# Patient Record
Sex: FEMALE | Race: BLACK OR AFRICAN AMERICAN | NOT HISPANIC OR LATINO
[De-identification: names, ages, dates, MRNs, and addresses within clinical notes are randomized per-mention and may not be internally consistent; named-entity substitution may affect disease eponyms.]

---

## 2018-11-21 PROBLEM — Z00.00 ENCOUNTER FOR PREVENTIVE HEALTH EXAMINATION: Status: ACTIVE | Noted: 2018-11-21

## 2018-11-28 ENCOUNTER — APPOINTMENT (OUTPATIENT)
Dept: ORTHOPEDIC SURGERY | Facility: CLINIC | Age: 48
End: 2018-11-28
Payer: COMMERCIAL

## 2018-11-28 VITALS
HEART RATE: 60 BPM | HEIGHT: 64 IN | WEIGHT: 205 LBS | SYSTOLIC BLOOD PRESSURE: 107 MMHG | BODY MASS INDEX: 35 KG/M2 | DIASTOLIC BLOOD PRESSURE: 69 MMHG

## 2018-11-28 PROCEDURE — 73564 X-RAY EXAM KNEE 4 OR MORE: CPT | Mod: LT

## 2018-11-28 PROCEDURE — 99204 OFFICE O/P NEW MOD 45 MIN: CPT

## 2019-04-17 ENCOUNTER — RESULT REVIEW (OUTPATIENT)
Age: 49
End: 2019-04-17

## 2019-11-06 ENCOUNTER — APPOINTMENT (OUTPATIENT)
Dept: ORTHOPEDIC SURGERY | Facility: CLINIC | Age: 49
End: 2019-11-06
Payer: COMMERCIAL

## 2019-11-06 VITALS
HEART RATE: 48 BPM | SYSTOLIC BLOOD PRESSURE: 114 MMHG | WEIGHT: 197 LBS | BODY MASS INDEX: 32.43 KG/M2 | DIASTOLIC BLOOD PRESSURE: 72 MMHG | HEIGHT: 65.5 IN

## 2019-11-06 DIAGNOSIS — Z78.9 OTHER SPECIFIED HEALTH STATUS: ICD-10-CM

## 2019-11-06 DIAGNOSIS — Z98.890 OTHER SPECIFIED POSTPROCEDURAL STATES: ICD-10-CM

## 2019-11-06 PROCEDURE — 73564 X-RAY EXAM KNEE 4 OR MORE: CPT | Mod: LT

## 2019-11-06 PROCEDURE — 99204 OFFICE O/P NEW MOD 45 MIN: CPT

## 2019-11-06 RX ORDER — DICLOFENAC SODIUM 50 MG/1
50 TABLET, DELAYED RELEASE ORAL
Qty: 60 | Refills: 0 | Status: ACTIVE | COMMUNITY
Start: 2019-11-06 | End: 1900-01-01

## 2019-11-06 RX ORDER — HYALURONATE SOD, CROSS-LINKED 30 MG/3 ML
30 SYRINGE (ML) INTRAARTICULAR
Qty: 1 | Refills: 0 | Status: ACTIVE | COMMUNITY
Start: 2019-11-06 | End: 1900-01-01

## 2019-11-06 NOTE — DISCUSSION/SUMMARY
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 The patella has a convex v shaped joint surface and the trochlea a matching concave v shape. The patella runs in the trochlea valley as the knee bends, increasing the leverage and allowing knee range of motion. However the patellofemoral joint experiences 20-50x the body weight in force when going up and down stairs during mid-flexion of the knee. The joint is most symptomatic with prolonged knee flexion or going up/down stairs. The treatment for this problem is predominantly non-operative consisting of patient education, activity modification, physical therapy with focus on VMO strengthening, oral and topical non-steroidal anti-inflammatories, knee braces including lateral J/shield's brace or infrapatellar band, and corticosteroid/viscosupplementation injection. The symptoms are chronic and are difficult to treat. In cases of failure to respond to non-operative management surgical options can be considered.\par \par \par Physical therapy prescribed with knee ROM exercises, quad/hamstring/VMO/Hip abductor strengthening, knee taping, patella mobilization, modalities PRN, and home exercise program.\par \par The patient was prescribed Diclofenac PO non-steroidal anti-inflammatory medication. 50mg tablets twice daily to be taken for at least 1-2 weeks in a row and then PRN afterwards. Risks and benefits were discussed and include but not limited to renal damage and GI ulceration and bleeding.  They were advised to take with food to limit stomach upset as well as warned to stop the medication if worsening gastric pain or dizziness or other side effects. Also to immediately stop the medication and seek appriate medical attention if any severe stomach ache, gastritis, black/red vomit, black/red stools or any other medical concern.\par \par Due to failure of prior non-operative modalities of treatment including physical therapy, activity modification, non-steroidal anti-inflammatory medications, and weight-loss with failure of multiple prior corticosteroid injections without appropriate improvement in symptoms and concern for too many frequent injections causing increased risk for adverse events, I am ordering a viscosupplementation injection gel one left knee and will obtain insurance authorization. The patient will be contacted by our authorization department over its status, copayment and when available for injection.\par \par The patient verifies their understanding the the visit, diagnosis and plan. They agree with the treatment plan and will contact the office with any questions or problems.\par \par Follow up\par 4 injection\par

## 2019-11-06 NOTE — PHYSICAL EXAM
[de-identified] : Physical Examination\par General: well nourished, in no acute distress, alert and oriented to person, place and time\par Psychiatric: normal mood and affect, no abnormal movements or speech patterns\par Eyes: vision intact - glasses\par Throat: no thyromegaly\par Lymph: no enlarged nodes, no lymphedema in extremity\par Respiratory: no wheezing, no shortness of breath with ambulation\par Cardiac: no cardiac leg swelling, 2+ peripheral pulses\par Neurology: normal gross sensation in extremities to light touch\par Abdomen: soft, non-tender, tympanic, no masses\par \par Musculoskeletal Examination\par Ambulation	+ antalgic gait, + assistive devices\par Severe left lower extremity guarding significantly limiting examination\par Knee			Right			Left\par General\par      Swelling/Deformity	normal			lateral patella bony protrusion, non-mobile attachted to patella, mild tender, not attached to subcutaneous skin	\par      Skin			normal			normal\par      Erythema		-			-\par      Standing Alignment	neutral			neutral\par      Effusion		none			trace\par Range of Motion\par      Hip			full painless ROM		full painless ROM&\par      Knee Flexion		130			100 limited\par      Knee Extension	0			0\par Complaint of pain and locking at 10° of flexion \par Patella\par      J Sign		-			-&\par      Quad Medial/Lateral	1/1 1/1\par      Apprehension		-			-&\par      Shalom's		-			+\par      Grind Sign		-			+\par      Crepitus		-			+\par Palpation patient diffusely tender tyhrough left knee\par      Medial Joint Line	-			-\par      Medial Fem Condyle	-			-\par      Lateral Joint Line	-			+\par      Quad Tendon		-			-\par      Patella Tendon	-			-\par      Medial Patella		-			+\par      Lateral Patella 	-			+\par      Posterior Knee	-			-\par Ligamentous\par      Varus @ 0° / 30°	-/-			-/-&\par      Valgus @ 0° / 30°	-/-			-/-&\par \par Strength Examination/Atrophy\par      Hip Flexors 		5+			5+&\par      Quadriceps		5+			5+&\par      Hamstring		5+			5+&\par      Tibialis Anterior	5+			5+&\par      Achilles/Soleus	5+			5+&\par Sensation\par      Deep Peroneal	normal			normal\par      Superficial Peroneal 	normal			normal\par      Sural  		normal			normal\par      Posterior Tibial 	normal			normal\par      Saphneous 		normal			normal\par Pulses\par      DP			2+			2+\par  [de-identified] : 5 views of the affected Left knee (standing AP, flexing standing AP, 30degree flexed lateral, 0degree lateral, sunrise view)\par were ordered, obtained and evaluated by myself today and\par demonstrate:\par There is no narrowing\par Trace osteophytic lipping\par Trace suprapatellar effusion\par Lateral bony ossicle with small osteophyte and trace patellofemoral joint space loss without evidence of tilt [or] subluxation on sunrise view with cystic changes patella\par Normal soft tissue density\par Otherwise normal osseous bone structure without fracture or dislocation\par \par \par MRI Left knee from Danielle Velasquez on 11 S6 to 18\par My impression of the images:\par Quality of the MRI is good\par Medial Meniscus ok\par Lateral Meniscus ok\par There is moderate to severe focal chondral loss in the patellofemoral compartments\par There is bone marrow edema[/subchondral cysts] in the patellofemoral and medial compartments\par LCL is intact\par MCL is intact\par ACL is intact\par PCL is intact \par Quadriceps Tendon is intact\par Patella Tendon is intact\par bony ossicle lateral patella, possible synchondrosis or soft tissue attachment, no edema\par \par The Final Radiologist Impression:\par The skin and subcutis are unremarkable.\par There is a small joint effusion. A 1.4 cm curvilinear osseous loose body is noted along\par the lateral margin of the mid patella.\par No popliteal cyst is visualized.\par There is no evidence of stress injury, fracture, osteonecrosis or osseous neoplasm.\par There is minimal osteoarthritis. Hypertrophic spurring of the lateral femoral condyle.\par Focal subcortical cystic changes noted involving the medial aspect of the medial tibial\par plateau. The articular surfaces are otherwise intact.\par The medial meniscus is unremarkable in MR appearance, without evidence of degeneration or\par tear.\par The lateral meniscus is unremarkable in MR appearance, without evidence of degeneration or\par tear.\par The ACL is unremarkable.\par The PCL is unremarkable.\par The collateral ligaments are unremarkable.\par The iliotibial band, fibular collateral ligament and biceps femoris tendon are\par unremarkable.\par The popliteus muscle-tendon unit is unremarkable.\par The quadriceps and patellar tendons are intact. There is thickening of the patellar\par insertion of the lateral retinaculum.\par There is advanced chondromalacia patella with cartilage thinning and subcortical cystic\par change involving both medial and lateral retropatellar facet. There is superimposed\par arthritic change of the lateral patellofemoral joint.\par IMPRESSION:\par Advanced chondromalacia patella with superimposed arthritic change of the lateral\par patellofemoral joint. There is thickening of the patellar attachment of the lateral\par retinaculum and a1.4 cm osseous loose body is noted along the lateral margin of the mid\par patella.\par Minimal arthritic changes involving both medial and lateral joint compartments, as\par described..\par

## 2019-11-06 NOTE — HISTORY OF PRESENT ILLNESS
[de-identified] : CC LEFT knee\par \par NOAM OHARA, 49 year old F RHD presents with chronic onset of 1 year of Intermittent pain in the lateral LEFT knee from stepping down a low step. The pain is worse, and rated a 5# out of 10, described as Sharp, without radiation. Rest makes the pain better and stairs and extending makes the pain worse. The patient reports associated symptoms of Locking/Grinding. The patient denies pain at night affecting sleep, and denies similar pain previously.\par \par The patient has tried the following treatments:\par Activity modification	+ \par Ice/Compression  	               + \par Braces    		               -\par Nsaids    		               +\par Physical Therapy  	              -\par Cortisone Injection	             +  no relief  \par Visco Injection		+  6 month 80% relief\par Arthroscopy		-\par \par Review of Systems is positive for the above musculoskeletal symptoms and is otherwise non-contributory for general, constitutional, psychiatric, neurologic, HEENT, cardiac, respiratory, gastrointestinal, reproductive, lymphatic, and dermatologic complaints.\par \par Consult by DUANE Abel\par

## 2019-12-16 ENCOUNTER — APPOINTMENT (OUTPATIENT)
Dept: ORTHOPEDIC SURGERY | Facility: CLINIC | Age: 49
End: 2019-12-16
Payer: COMMERCIAL

## 2019-12-16 DIAGNOSIS — M17.12 UNILATERAL PRIMARY OSTEOARTHRITIS, LEFT KNEE: ICD-10-CM

## 2019-12-16 DIAGNOSIS — M23.42 LOOSE BODY IN KNEE, LEFT KNEE: ICD-10-CM

## 2019-12-16 DIAGNOSIS — M22.42 CHONDROMALACIA PATELLAE, LEFT KNEE: ICD-10-CM

## 2019-12-16 PROCEDURE — 20611 DRAIN/INJ JOINT/BURSA W/US: CPT | Mod: LT

## 2019-12-16 NOTE — PROCEDURE
[de-identified] : Chief complaint:     Bilateral knee pain\par \par Diagnosis:              Bilateral knee osteoarthritis\par \par \par Injection:\par Gel one x1\par      [id number]\par \par \par Procedure Note:\par \par Risks and benefits of an arthrocentesis and intraarticular hyaluronic injection of the LEFT and RIGHT knee were discussed with the patient. Potential adverse effects were discussed including but not limited to bleeding, skin/ joint infection, local skin reactions including bleaching, bruising, stiffness, soreness, vasovagal episodes, transient hyperglycemia, avascular necrosis, pseudo-septic type reactions, post injection joint pain, allergic reaction to product or anesthetic and other rare but potential adverse effects along with benefits including decreased pain and improved stability prior to obtaining verbal informed consent. It was also discussed that for some patients the treatment is ineffective and there are no guarantees that the patient will experience improvement as the result of the injection. In rare occasions the injection can cause worsening of pain.\par \par The use of a Sonosite 15-6 MHz linear transducer with live ultrasound guidance of the knee was necessary given the patient's BMI and local body habitus overlying and obscuring the accurate identification of normal body bony anatomy used to identify the injection site and the depth of soft tissue envelope necessitating a longer than normal needle to reach the joint space, and to confirm the location of the needle tip and intra-articular delivery of the medication. Without the use of live ultrasound guidance the injection would have been more difficult and place the patient's neurovascular structures at risk from the longer needle needed to traverse the soft tissue envelope.\par \par A 6 inch bolster was placed underneath the knee to place the LEFT knee in a slightly flexed position. The superolateral injection site was identified using the ultrasound probe to identify the suprapatellar space and superior boarder of the patella first longitudinally and then transversely. The injection site was marked and prepped with a ChloraPrep swab and anesthetized with ethylchloride skin anesthesia. Under sterile technique a 20g 3 1/2 in spinal needle with a preloaded viscosupplementation syringe was passed through the injection site towards the suprapatellar space under live ultrasound guidance and noted to penetrate the joint capsule. The medication was injected without resistance under live ultrasound visualization and noted to flow into the suprapatellar joint space. The injection site was sterilely dressed, there was minimal blood loss.\par \par The patient tolerated this procedure without any complications done by myself. Images were recorded and saved.\par \par The patient has been advised that if they notice any worsening of symptoms or any problems to contact me and seek care from a qualified medical professional. The patient was instructed to ice the knee and take NSAID medication on an as needed basis if the patient feels discomfort.\par \par Followup injection [6 months]

## 2020-10-16 ENCOUNTER — EMERGENCY (EMERGENCY)
Facility: HOSPITAL | Age: 50
LOS: 1 days | Discharge: HOME | End: 2020-10-16
Attending: EMERGENCY MEDICINE | Admitting: EMERGENCY MEDICINE
Payer: COMMERCIAL

## 2020-10-16 VITALS
RESPIRATION RATE: 18 BRPM | OXYGEN SATURATION: 100 % | HEART RATE: 88 BPM | SYSTOLIC BLOOD PRESSURE: 135 MMHG | TEMPERATURE: 100 F | DIASTOLIC BLOOD PRESSURE: 78 MMHG

## 2020-10-16 VITALS
OXYGEN SATURATION: 100 % | DIASTOLIC BLOOD PRESSURE: 88 MMHG | HEART RATE: 94 BPM | SYSTOLIC BLOOD PRESSURE: 143 MMHG | TEMPERATURE: 100 F | RESPIRATION RATE: 18 BRPM

## 2020-10-16 DIAGNOSIS — Z98.890 OTHER SPECIFIED POSTPROCEDURAL STATES: Chronic | ICD-10-CM

## 2020-10-16 LAB
ALBUMIN SERPL ELPH-MCNC: 4.1 G/DL — SIGNIFICANT CHANGE UP (ref 3.3–5)
ALP SERPL-CCNC: 112 U/L — SIGNIFICANT CHANGE UP (ref 40–120)
ALT FLD-CCNC: 28 U/L — SIGNIFICANT CHANGE UP (ref 4–33)
ANION GAP SERPL CALC-SCNC: 10 MMO/L — SIGNIFICANT CHANGE UP (ref 7–14)
AST SERPL-CCNC: 42 U/L — HIGH (ref 4–32)
BASE EXCESS BLDV CALC-SCNC: 3.1 MMOL/L — SIGNIFICANT CHANGE UP
BASOPHILS # BLD AUTO: 0.06 K/UL — SIGNIFICANT CHANGE UP (ref 0–0.2)
BASOPHILS NFR BLD AUTO: 0.4 % — SIGNIFICANT CHANGE UP (ref 0–2)
BILIRUB SERPL-MCNC: 0.5 MG/DL — SIGNIFICANT CHANGE UP (ref 0.2–1.2)
BLOOD GAS VENOUS - CREATININE: 0.91 MG/DL — SIGNIFICANT CHANGE UP (ref 0.5–1.3)
BUN SERPL-MCNC: 9 MG/DL — SIGNIFICANT CHANGE UP (ref 7–23)
CALCIUM SERPL-MCNC: 9.3 MG/DL — SIGNIFICANT CHANGE UP (ref 8.4–10.5)
CHLORIDE BLDV-SCNC: 111 MMOL/L — HIGH (ref 96–108)
CHLORIDE SERPL-SCNC: 102 MMOL/L — SIGNIFICANT CHANGE UP (ref 98–107)
CO2 SERPL-SCNC: 26 MMOL/L — SIGNIFICANT CHANGE UP (ref 22–31)
CREAT SERPL-MCNC: 0.78 MG/DL — SIGNIFICANT CHANGE UP (ref 0.5–1.3)
EOSINOPHIL # BLD AUTO: 0.15 K/UL — SIGNIFICANT CHANGE UP (ref 0–0.5)
EOSINOPHIL NFR BLD AUTO: 1 % — SIGNIFICANT CHANGE UP (ref 0–6)
GAS PNL BLDV: 134 MMOL/L — LOW (ref 136–146)
GLUCOSE BLDV-MCNC: 94 MG/DL — SIGNIFICANT CHANGE UP (ref 70–99)
GLUCOSE SERPL-MCNC: 98 MG/DL — SIGNIFICANT CHANGE UP (ref 70–99)
HBA1C BLD-MCNC: 5.1 % — SIGNIFICANT CHANGE UP (ref 4–5.6)
HCO3 BLDV-SCNC: 26 MMOL/L — SIGNIFICANT CHANGE UP (ref 20–27)
HCT VFR BLD CALC: 41.4 % — SIGNIFICANT CHANGE UP (ref 34.5–45)
HCT VFR BLDV CALC: 41.3 % — SIGNIFICANT CHANGE UP (ref 34.5–45)
HGB BLD-MCNC: 13 G/DL — SIGNIFICANT CHANGE UP (ref 11.5–15.5)
HGB BLDV-MCNC: 13.4 G/DL — SIGNIFICANT CHANGE UP (ref 11.5–15.5)
HIV COMBO RESULT: SIGNIFICANT CHANGE UP
HIV1+2 AB SPEC QL: SIGNIFICANT CHANGE UP
IMM GRANULOCYTES NFR BLD AUTO: 0.4 % — SIGNIFICANT CHANGE UP (ref 0–1.5)
LACTATE BLDV-MCNC: 2.5 MMOL/L — HIGH (ref 0.5–2)
LYMPHOCYTES # BLD AUTO: 16 % — SIGNIFICANT CHANGE UP (ref 13–44)
LYMPHOCYTES # BLD AUTO: 2.49 K/UL — SIGNIFICANT CHANGE UP (ref 1–3.3)
MCHC RBC-ENTMCNC: 25.5 PG — LOW (ref 27–34)
MCHC RBC-ENTMCNC: 31.4 % — LOW (ref 32–36)
MCV RBC AUTO: 81.3 FL — SIGNIFICANT CHANGE UP (ref 80–100)
MONOCYTES # BLD AUTO: 0.85 K/UL — SIGNIFICANT CHANGE UP (ref 0–0.9)
MONOCYTES NFR BLD AUTO: 5.4 % — SIGNIFICANT CHANGE UP (ref 2–14)
NEUTROPHILS # BLD AUTO: 12 K/UL — HIGH (ref 1.8–7.4)
NEUTROPHILS NFR BLD AUTO: 76.8 % — SIGNIFICANT CHANGE UP (ref 43–77)
NRBC # FLD: 0 K/UL — SIGNIFICANT CHANGE UP (ref 0–0)
PCO2 BLDV: 55 MMHG — HIGH (ref 41–51)
PH BLDV: 7.33 PH — SIGNIFICANT CHANGE UP (ref 7.32–7.43)
PLATELET # BLD AUTO: 341 K/UL — SIGNIFICANT CHANGE UP (ref 150–400)
PMV BLD: 11.3 FL — SIGNIFICANT CHANGE UP (ref 7–13)
PO2 BLDV: 45 MMHG — HIGH (ref 35–40)
POTASSIUM BLDV-SCNC: 3.9 MMOL/L — SIGNIFICANT CHANGE UP (ref 3.4–4.5)
POTASSIUM SERPL-MCNC: 5.2 MMOL/L — SIGNIFICANT CHANGE UP (ref 3.5–5.3)
POTASSIUM SERPL-SCNC: 5.2 MMOL/L — SIGNIFICANT CHANGE UP (ref 3.5–5.3)
PROT SERPL-MCNC: 8.3 G/DL — SIGNIFICANT CHANGE UP (ref 6–8.3)
RBC # BLD: 5.09 M/UL — SIGNIFICANT CHANGE UP (ref 3.8–5.2)
RBC # FLD: 12.3 % — SIGNIFICANT CHANGE UP (ref 10.3–14.5)
SAO2 % BLDV: 74.1 % — SIGNIFICANT CHANGE UP (ref 60–85)
SODIUM SERPL-SCNC: 138 MMOL/L — SIGNIFICANT CHANGE UP (ref 135–145)
WBC # BLD: 15.61 K/UL — HIGH (ref 3.8–10.5)
WBC # FLD AUTO: 15.61 K/UL — HIGH (ref 3.8–10.5)

## 2020-10-16 PROCEDURE — 99284 EMERGENCY DEPT VISIT MOD MDM: CPT

## 2020-10-16 RX ORDER — KETOROLAC TROMETHAMINE 30 MG/ML
30 SYRINGE (ML) INJECTION ONCE
Refills: 0 | Status: DISCONTINUED | OUTPATIENT
Start: 2020-10-16 | End: 2020-10-16

## 2020-10-16 RX ORDER — OXYCODONE AND ACETAMINOPHEN 5; 325 MG/1; MG/1
1 TABLET ORAL ONCE
Refills: 0 | Status: DISCONTINUED | OUTPATIENT
Start: 2020-10-16 | End: 2020-10-16

## 2020-10-16 RX ORDER — KETOROLAC TROMETHAMINE 30 MG/ML
15 SYRINGE (ML) INJECTION ONCE
Refills: 0 | Status: DISCONTINUED | OUTPATIENT
Start: 2020-10-16 | End: 2020-10-16

## 2020-10-16 RX ORDER — OXYCODONE HYDROCHLORIDE 5 MG/1
1 TABLET ORAL
Qty: 12 | Refills: 0
Start: 2020-10-16 | End: 2020-10-18

## 2020-10-16 RX ORDER — ACYCLOVIR SODIUM 500 MG
1 VIAL (EA) INTRAVENOUS
Qty: 21 | Refills: 0
Start: 2020-10-16 | End: 2020-10-22

## 2020-10-16 RX ADMIN — OXYCODONE AND ACETAMINOPHEN 1 TABLET(S): 5; 325 TABLET ORAL at 19:06

## 2020-10-16 RX ADMIN — Medication 30 MILLIGRAM(S): at 19:16

## 2020-10-16 NOTE — ED PROVIDER NOTE - ATTENDING CONTRIBUTION TO CARE
Seen and examined, pt. recently rec'd antifungal for rash near breast with improvement, then went to MD for groin rash, pustular, rec'd mupirocin and oral keflex, much worse since Wed. with inc. eruptions and discharge, +Swelling, no fever/chills, no vaginal discharge, no urinary sx, no abd/back pain, no N/V.  exam with groin and L mons > R rash with open pustules, no redness or swelling at introitus, inner labia normal, no fluctuance, no blood in vault.

## 2020-10-16 NOTE — ED ADULT NURSE NOTE - OBJECTIVE STATEMENT
Pt a&ox3 c/o pain to pelvic region endorses vaginal discharge, states symptoms started about 3 weeks ago, + dysuria, denies hematuria, abd soft, non-tender, non-distended, breathing even and unlabored, denies chest pain, no headache/dizziness, skin is cool dry and intact, ivl placed, labs sent, medicated as ordered, will continue to monitor.

## 2020-10-16 NOTE — ED PROVIDER NOTE - PROGRESS NOTE DETAILS
AMANDA DAVALOS:  Pt notes feeling better.  Pt medically stable for discharge.  Strict return precautions given.  Reassessment performed and plan for discharge discussed with Dr. Blanc who agrees with disposition and discharge plan.

## 2020-10-16 NOTE — ED PROVIDER NOTE - PATIENT PORTAL LINK FT
You can access the FollowMyHealth Patient Portal offered by [facility] by registering at the following website: [email or web address]. By joining [facility]’s FollowMyHealth portal, you will also be able to view your health information using other applications (apps) compatible with our system.

## 2020-10-16 NOTE — ED ADULT TRIAGE NOTE - CHIEF COMPLAINT QUOTE
Pt states she's had a ringworm infection that spread from breast to groin area, treated with OTC antifungals. Pt states she then developed a secondary infection in the groin, was given antibiotics by PMD, reports continued symptoms. C/o pain

## 2020-10-16 NOTE — ED PROVIDER NOTE - GENITOURINARY EXTERNAL GENERAL. FEMALE
RASH/LESIONS/+clusters of vesicles and pustules with yellow, white colored drainage at mons pubis, bilateral groin and bilateral labia; no crepitus; no fluctuance

## 2020-10-16 NOTE — ED PROVIDER NOTE - NSFOLLOWUPINSTRUCTIONS_ED_ALL_ED_FT
Advance activity as tolerated.  Continue all previously prescribed medications as directed unless otherwise instructed.  Take Aycyclovir 400 mg one pill three times a day for 1 week.  Take Motrin (also sold as Advil or Ibuprofen) 400-600 mg (two or three 200 mg over the counter pills) every 8 hours as needed for moderate pain or fevers-- take with food.  Take Percocet 325/5 once every 6 hours as needed for severe pain -- causes drowsiness; DO NOT drink alcohol, drive, or operate heavy machinery with this medication.  Caution federal law prohibits the transfer of this drug to any person other  than the person for whom it was prescribed. May cause drowsiness.  Alcohol may intensify this effect.  Use care when operating dangerous machinery.  This prescription cannot be refilled. Using more of this medication than prescribed may cause serious breathing problems  Follow up with your primary care physician and OB/GYN (referral list provided or call [phone] to make an appointment with the OB/GYN clinic) in 48-72 hours- bring copies of your results.  Return to the ER for worsening or persistent symptoms, including but not limited to worsening/persistent pain, fevers, difficulty or painful urinating and/or ANY NEW OR CONCERNING SYMPTOMS. If you have issues obtaining follow up, please call: [phone] to obtain a doctor or specialist who takes your insurance in your area.  You may call [phone] to make an appointment with the internal medicine clinic.

## 2020-10-16 NOTE — ED PROVIDER NOTE - OBJECTIVE STATEMENT
Pt is a 61 y/o F PMHx myomectomy p/w rash x 1 week.  Pt states 3 weeks ago, she developed ringworm at mid anterior chest, which resolved after taking [facility] OTC Antifungal.  Pt notes over past 1 week, she developed lesions at mons pubis, bilateral groin and labia, gradually worsening over time associated with aching pain and yellow colored drainage.  Two days ago, she was started on mupirocin and Keflex by PMD, which pt has been compliant with.  Pt presents today for worsening pain.  Pt denies any fevers, chills, dysuria, cloudy urine, difficulty urinating, vaginal bleeding, vaginal discharge, h/o STDs, or any other specific complaints.

## 2020-10-16 NOTE — ED PROVIDER NOTE - CLINICAL SUMMARY MEDICAL DECISION MAKING FREE TEXT BOX
Pt is a 61 y/o F PMHx myomectomy p/w rash x 1 week.   -- likely superinfected herpes infection; no e/o abscess -- labs, continue antibiotics, antiviral, pain control

## 2020-10-17 LAB
HSV1 IGG SER-ACNC: 27.1 INDEX — HIGH
HSV1 IGG SERPL QL IA: POSITIVE — HIGH
HSV2 IGG FLD-ACNC: >23.6 INDEX — HIGH
HSV2 IGG SERPL QL IA: POSITIVE — HIGH

## 2020-10-18 NOTE — ED POST DISCHARGE NOTE - REASON FOR FOLLOW-UP
Other Herpes simplex 1gG AB and herpes simplex 2 IgG AB positive. Patient contact # [phone] discussed with patient positive results and to follow up with MD. Patient discharged home with a prescription for Acyclovir.

## 2020-10-20 LAB
HSV1 AB FLD QL: SIGNIFICANT CHANGE UP TITER
HSV2 AB FLD-ACNC: SIGNIFICANT CHANGE UP TITER

## 2020-10-21 ENCOUNTER — INPATIENT (INPATIENT)
Facility: HOSPITAL | Age: 50
LOS: 5 days | Discharge: HOME | End: 2020-10-27
Attending: OBSTETRICS & GYNECOLOGY | Admitting: OBSTETRICS & GYNECOLOGY
Payer: COMMERCIAL

## 2020-10-21 VITALS
TEMPERATURE: 99 F | HEART RATE: 78 BPM | DIASTOLIC BLOOD PRESSURE: 95 MMHG | OXYGEN SATURATION: 100 % | SYSTOLIC BLOOD PRESSURE: 141 MMHG | RESPIRATION RATE: 18 BRPM

## 2020-10-21 DIAGNOSIS — Z98.890 OTHER SPECIFIED POSTPROCEDURAL STATES: Chronic | ICD-10-CM

## 2020-10-21 LAB
ALBUMIN SERPL ELPH-MCNC: 4.2 G/DL — SIGNIFICANT CHANGE UP (ref 3.3–5)
ALP SERPL-CCNC: 93 U/L — SIGNIFICANT CHANGE UP (ref 40–120)
ALT FLD-CCNC: 23 U/L — SIGNIFICANT CHANGE UP (ref 4–33)
ANION GAP SERPL CALC-SCNC: 10 MMO/L — SIGNIFICANT CHANGE UP (ref 7–14)
AST SERPL-CCNC: 22 U/L — SIGNIFICANT CHANGE UP (ref 4–32)
BASOPHILS # BLD AUTO: 0.07 K/UL — SIGNIFICANT CHANGE UP (ref 0–0.2)
BASOPHILS NFR BLD AUTO: 0.6 % — SIGNIFICANT CHANGE UP (ref 0–2)
BILIRUB SERPL-MCNC: 0.3 MG/DL — SIGNIFICANT CHANGE UP (ref 0.2–1.2)
BUN SERPL-MCNC: 17 MG/DL — SIGNIFICANT CHANGE UP (ref 7–23)
CALCIUM SERPL-MCNC: 9.8 MG/DL — SIGNIFICANT CHANGE UP (ref 8.4–10.5)
CHLORIDE SERPL-SCNC: 105 MMOL/L — SIGNIFICANT CHANGE UP (ref 98–107)
CO2 SERPL-SCNC: 29 MMOL/L — SIGNIFICANT CHANGE UP (ref 22–31)
CREAT SERPL-MCNC: 1.04 MG/DL — SIGNIFICANT CHANGE UP (ref 0.5–1.3)
EOSINOPHIL # BLD AUTO: 0.42 K/UL — SIGNIFICANT CHANGE UP (ref 0–0.5)
EOSINOPHIL NFR BLD AUTO: 3.8 % — SIGNIFICANT CHANGE UP (ref 0–6)
GLUCOSE SERPL-MCNC: 111 MG/DL — HIGH (ref 70–99)
HCT VFR BLD CALC: 41.1 % — SIGNIFICANT CHANGE UP (ref 34.5–45)
HGB BLD-MCNC: 12.2 G/DL — SIGNIFICANT CHANGE UP (ref 11.5–15.5)
HIV COMBO RESULT: SIGNIFICANT CHANGE UP
HIV1+2 AB SPEC QL: SIGNIFICANT CHANGE UP
IMM GRANULOCYTES NFR BLD AUTO: 0.4 % — SIGNIFICANT CHANGE UP (ref 0–1.5)
LYMPHOCYTES # BLD AUTO: 3.4 K/UL — HIGH (ref 1–3.3)
LYMPHOCYTES # BLD AUTO: 30.7 % — SIGNIFICANT CHANGE UP (ref 13–44)
MCHC RBC-ENTMCNC: 24.9 PG — LOW (ref 27–34)
MCHC RBC-ENTMCNC: 29.7 % — LOW (ref 32–36)
MCV RBC AUTO: 84 FL — SIGNIFICANT CHANGE UP (ref 80–100)
MONOCYTES # BLD AUTO: 0.74 K/UL — SIGNIFICANT CHANGE UP (ref 0–0.9)
MONOCYTES NFR BLD AUTO: 6.7 % — SIGNIFICANT CHANGE UP (ref 2–14)
NEUTROPHILS # BLD AUTO: 6.41 K/UL — SIGNIFICANT CHANGE UP (ref 1.8–7.4)
NEUTROPHILS NFR BLD AUTO: 57.8 % — SIGNIFICANT CHANGE UP (ref 43–77)
NRBC # FLD: 0 K/UL — SIGNIFICANT CHANGE UP (ref 0–0)
PLATELET # BLD AUTO: 368 K/UL — SIGNIFICANT CHANGE UP (ref 150–400)
PMV BLD: 10.7 FL — SIGNIFICANT CHANGE UP (ref 7–13)
POTASSIUM SERPL-MCNC: 4 MMOL/L — SIGNIFICANT CHANGE UP (ref 3.5–5.3)
POTASSIUM SERPL-SCNC: 4 MMOL/L — SIGNIFICANT CHANGE UP (ref 3.5–5.3)
PROT SERPL-MCNC: 7.7 G/DL — SIGNIFICANT CHANGE UP (ref 6–8.3)
RBC # BLD: 4.89 M/UL — SIGNIFICANT CHANGE UP (ref 3.8–5.2)
RBC # FLD: 12 % — SIGNIFICANT CHANGE UP (ref 10.3–14.5)
SODIUM SERPL-SCNC: 144 MMOL/L — SIGNIFICANT CHANGE UP (ref 135–145)
WBC # BLD: 11.08 K/UL — HIGH (ref 3.8–10.5)
WBC # FLD AUTO: 11.08 K/UL — HIGH (ref 3.8–10.5)

## 2020-10-21 PROCEDURE — 99285 EMERGENCY DEPT VISIT HI MDM: CPT

## 2020-10-21 RX ORDER — IBUPROFEN 200 MG
600 TABLET ORAL EVERY 6 HOURS
Refills: 0 | Status: DISCONTINUED | OUTPATIENT
Start: 2020-10-21 | End: 2020-10-27

## 2020-10-21 RX ORDER — MORPHINE SULFATE 50 MG/1
2 CAPSULE, EXTENDED RELEASE ORAL EVERY 4 HOURS
Refills: 0 | Status: DISCONTINUED | OUTPATIENT
Start: 2020-10-21 | End: 2020-10-27

## 2020-10-21 RX ORDER — ACETAMINOPHEN 500 MG
975 TABLET ORAL EVERY 6 HOURS
Refills: 0 | Status: DISCONTINUED | OUTPATIENT
Start: 2020-10-21 | End: 2020-10-27

## 2020-10-21 NOTE — ED ADULT NURSE NOTE - CHIEF COMPLAINT QUOTE
Pt reports she was seen in ED Friday for vaginal rash, Dx on Saturday with herpes. Pt saw [facility] outpatient today for increased rash told to come to ED for IV abx. Denies fever, chills. Took motrin 400 mg 1 hour ago.

## 2020-10-21 NOTE — ED PROVIDER NOTE - PHYSICAL EXAMINATION
ATTENDING PHYSICAL EXAM  GEN - NAD; obvious discomfort with movement; A+O x3  HEAD - NC/AT; EYES/NOSE - PERRL, EOMI  NECK: Neck supple, non-tender without lymphadenopathy, no masses, no JVD  PULMONARY - CTA b/l, symmetric breath sounds  CARDIAC -s1s2, mild tachy, no M,R,G  ABDOMEN - +BS, ND, NT, soft, lower abd/mons pubis exam performed with chaperone YOLI Geller.  Noted multiple ulcerated lesions approx 0.5-0.8cm in diameter with mucousy discharge, + swelling, no erythema or warmth.  Lesions extending onto anteriro aspect of labia majora.   BACK - no CVA tenderness, No vertebral or paravertebral tenderness  EXTREMITIES - symmetric pulses, 2+ dp, capillary refill < 2 seconds, no clubbing, no cyanosis, no edema  NEUROLOGIC - alert, CN 2-12 intact, no focal deficits.

## 2020-10-21 NOTE — ED ADULT NURSE NOTE - OBJECTIVE STATEMENT
49yo female received in intake 8. pt A&Ox3, c/o of rash on mons pubis starting last week. she was recently diagnosed with herpes and has been taking antibiotic and acyclovir with no improvement to the rash. Rash now worsened with ulcerated lesion and discharges and foul smell. pt c/o pain to area but refuses pain medication. states pain is tolerable. states was last sexual active last year. LMP last year. respiration even and non-labored. in nad. will cont to monitor. 49yo female received in intake 8. pt A&Ox3, c/o of rash on mons pubis starting last week. she was recently diagnosed with herpes and has been taking antibiotic and acyclovir with no improvement to the rash. Rash now worsened with ulcerated lesion and discharges and foul smell. pt c/o pain to area but refuses pain medication. states pain is tolerable. denies fever and chill. states was last sexual active last year. LMP last year. respiration even and non-labored. in nad. will cont to monitor.

## 2020-10-21 NOTE — CHART NOTE - NSCHARTNOTEFT_GEN_A_CORE
Dermatology Chart Note    History: 51 yo F no PMH p/w painful rash x 1.5 weeks - completed a 7 day course of keflex (from PCP), recently visited ED on 10/16 where she was started on acyclovir 400 TID (10/17-10/21). ED sent her to see GYN outpatient who referred her back to ED. Pt reports worsening of the rash over this time. denies F/C/fatigue or any other symptoms. Denies any new meds, skin products, or any recent illnesses.      Physical Exam:  VS wnl  Labs reviewed, significant for wbc decreased from 15 (10/16) to 11 (10/21) and +HSV 1 and 2 IgG Ab    Differential favors HSV.  At this time recommend:  -agree with obtaining HSV /VZV PCR  -obtain bacterial cx from a pustule by gently unroofing intact pustule with 18gauge needle and swabbing for cx  -C/w acyclovir 400mg TID for 7-10 days total  -c/w keflex until cx results  -f/u with Dermatology on 10/26 at our outpatient office  [facility] [address], [place], [place]  [phone]    The patient's chart was reviewed in addition to photos of the rash taken by the patient.  Patient was seen at bedside and discussed remotely with the dermatology attending Dr. Miranda Mueller.  Recommendations were communicated with the primary team.  Please page [phone] for further related questions.    Sandee Leahy MD  Resident Physician, PGY2  [facility] Dermatology  Pager: [phone]  Office: [phone] Dermatology Chart Note    History: 51 yo F no PMH p/w painful rash x 1.5 weeks - completed a 7 day course of keflex (from PCP), recently visited ED on 10/16 where she was started on acyclovir 400 TID (10/17-10/21). ED sent her to see GYN outpatient who referred her back to ED. Pt reports worsening of the rash over this time. denies F/C/fatigue or any other symptoms. Denies any new meds, skin products, or any recent illnesses.      Physical Exam:  VS wnl  Per photos,  mons pubis and labia majora with multiple punched out ulcerations and intact pustules on background of erythema    Labs reviewed, significant for wbc decreased from 15 (10/16) to 11 (10/21) and +HSV 1 and 2 IgG Ab    Differential favors HSV.  At this time recommend:  -agree with obtaining HSV /VZV PCR  -obtain bacterial cx from a pustule by gently unroofing intact pustule with 18gauge needle and swabbing for cx  -C/w acyclovir 400mg TID for 7-10 days total  -c/w keflex until cx results  -f/u with Dermatology on 10/26 at our outpatient office  [address], [address], [place], [place]  [phone]    The patient's chart was reviewed in addition to photos of the rash taken by the patient.  Patient was seen at bedside and discussed remotely with the dermatology attending Dr. Miranda Mueller.  Recommendations were communicated with the primary team.  Please page [phone] for further related questions.    Sandee Leahy MD  Resident Physician, PGY2  [facility] Dermatology  Pager: [phone]  Office: [phone]

## 2020-10-21 NOTE — ED PROVIDER NOTE - CLINICAL SUMMARY MEDICAL DECISION MAKING FREE TEXT BOX
51 y/o F with worsening rash on mons pubis/vulva.  Severe pain.  Check labs.  Derm and OBGYN consults appreciated.  Will admit to Dr. Wise for pain management and observation for progressing of infection.

## 2020-10-21 NOTE — ED PROVIDER NOTE - OBJECTIVE STATEMENT
49 y/o F 49 y/o F c/o rash on mons pubis.  To PMD Dr. Arauz on 10/14 and prescribed mupirocin and keflex which she has continued to take.  On 10/16 to [facility] ED.  [place] records reviewed.  Rash thought due to herpes and prescribed acyclovir which patient started taking on Saturday.  Rash continues to be present with yellowish discharge and pain, so to OBGYN Dr. Lion today who referred to ED for further evaluation.  I spoke to Dr. Wise who is covering for Dr. Lion, and she is asking her team to come evaluate patient in ED.  Patient reports taking ibuprofen for pain, and taking oxycodone only to help with sleep.  Does not want any pain medicine now.  Denies dysuria or hematuria.  No fever.  No abd pain, n/v/d.  No cough or covid diagnosis.  Offered HIV testing and patient accepts.  Patient is menopausal.

## 2020-10-21 NOTE — ED ADULT NURSE NOTE - NSFALLRSKUNASSIST_ED_ALL_ED
Total Hip Replacement Discharge Instructions    [phone]  Bone and Joint Service Line for issues or concerns    Discharge prescriptions:  Dilaudid 2 mg tablets:  Take one or two tablets every 4 hours as needed for pain.  Gabapentin 300 mg tablet:  Take one tablet nightly for pain (30 days)  Mobic 7.5 mg tablet:  Take one tablet daily for pain (30 days)  Flexeril 10 mg tablet:  Take one tablet up to 3 times daily for pain from spasms  Atarax 25 mg tablet:  Take one-two tablets every 6 hours as needed to augment pain control or for itching.   Stool Softener:    You may use any stool softener you are familiar with. We have suggested over the counter Sennakot as a fall back.  Tylenol :  You may take one or two tablets (325mg) every six hours as needed.  Aspirin 325 mg:  Take one tablet twice daily for blood thinning and prevention of blood clots (30 days)    General Care:  After surgery you may feel tired/sleepy. This is normal. If you have any question along the way please contact the office. If you feel it is an issue cannot wait for normal office hours, contact the on-call physician. You should not drive or operate machines/equipment until released by your physician to do so.     Bandages:    It s normal to have some blood-tinged fluid on your bandages, this may occur for a few days after being sent home from the hospital. Leave the dressing placed in the hospital for the next 10 days.  This is a water resistant dressing so you may shower over this.  This will be removed in clinic along with your staples. You may also notice a few drops of blood from your incision as you begin to move more this is normal. Keep the area clean and dry.      Bathing:  Do not submerge your incision in water such as a bath or pool. It is ok to shower when you get home over the aquacell water resistant bandage.  You may find in comfortable to get a shower chair for the first month while you continue to heal and get stronger.     Follow  up:  Your follow up appointment should already be scheduled. If it s not, please call the office to verify an appointment 10-14 days after surgery..     Diet:  You may not have a full appetite at discharge this should get better. Progress to bland foods such as crackers and bread and finally to your normal diet if you have no problems.  Avoid alcohol when taking narcotic pain medications.      Pain control:  Take your pain medications as prescribed. These medications may make you sleepy. Do not drive, operate equipment, or drink alcohol when taking these.  You may take Tylenol (Generic name is acetaminophen) as directed on the bottle for additional relief or in place of the prescribed pain medications as your pain gets better.  If the medications cause a reaction such as nausea or skin rash, stop taking them and contact your doctor. Please plan accordingly, pain medications will not be re-filled on the weekends or at night. Call the office during the day if you need more medications.    Blood thinner:  It is very important to take the Aspirin 325 mg twice a day to help prevent blood clots. No medication is perfect, so if you notice a sudden onset of pain/swelling in your calf area call your doctor. If you notice a sudden onset of troubles breathing and/or chest pain call 911.     Icing:  It is common for some swelling, aching and stiffness to occur for awhile after a hip replacement. Apply for 20 minutes at a time. For the first 1-2 weeks apply ice 2-3 x a day or more after therapy.    Walker/crutches:  Use a walker/crutches when you go home. You will transition to the use of a cane and finally to no additional support.     Physical Therapy:  The success of your hip replacement is based on doing physical therapy. You will have some pain and discomfort along the way. If you feel your pain is limiting your progress make sure to take some pain medication prior to your therapy session. If your pain medications are not  working talk to your surgeon.   For the first 1-2 weeks after going home you will have in-home physical therapy. The goal is to work on walking and feeling steady.  Usually after your first post-operative follow up you will move to out-patient physical therapy.     Activity:  Unless otherwise instructed, you can weight bear as tolerated with a walker/cane. For 6 weeks follow these listed precautions:  Do not bend the operated hip past 90 degrees (for example sitting in a low couch or toilet). Use a raised seat on your toilet for 6-8 weeks. If you find yourself in a low seat, keep your legs apart (don t let the knees touch) and kneecaps facing forward when getting up. Please ask for help.    Do not cross the midline of your body with the operated leg.  Do not rotate the operated leg inward, your toes and kneecap should point toward the ceiling when in bed.       Normal findings after surgery:  Numbness and tenderness around the incisions.   You may have bruising around the incisions and down the thigh.   Low grade fevers less than 100.5 degrees Fahrenheit are normal.   You will have some increased pain after your therapy sessions.     When to call the Office:  Temperature greater than 101.5 degrees Fahreheit.  Fever, chills, and increasing pain in the hip.  Excessive drainage from the incisions that include bright red blood.  Drainage from the incisions sites that appear yellow, pus-like, or foul smelling.  Increasing pain the hip not relieved by the prescribed pain medications or ice.  Persistent nausea or vomiting not helped by the Zofran.  Increased pain or swelling in your calf area (in back above your ankle) that wasn t there when in the hospital.  Any other effects you feel are significant.  Call 911 if you experience any chest pain and/or shortness or breath.                                                                   EDEMA CONTROL  (Do not let your leg swell!!)    Swelling in your injured, or recently  operated on, extremity is one of the worst things YOU can let happen.     Swelling will:   1)  Lead to more pain and stiffness  2)  Causes tissues to be unhealthy.  Bacteria like this.  3)  Puts you at risk for deadly blood clots.    Things YOU must do to control the EDEMA (swelling) are as follows:      Compress the extremity in a uniform way.  Use a compressive wrap from toes to your groin or wrist to shoulder.  Not needed at night.    Icing of the injured area soothes the angry tissues.    YOU MUST ELEVATE THE INJURED EXTREMITY ABOVE YOUR HEART AS MUCH AS POSSIBLE!!   A foot stool or the arm rest is not enough.  Edema is water and water flows down hill.  Your heart is the pump so you are getting the water back to the pump.    I cannot do this for you.  Only you and your help at home can get this done.    THANKS!!                     no

## 2020-10-21 NOTE — ED ADULT TRIAGE NOTE - CHIEF COMPLAINT QUOTE
Pt reports she was seen in ED Friday for vaginal rash, Dx on Saturday with herpes. Pt saw MD. iLon GYN outpatient today for increased rash told to come to ED for IV abx. Denies fever, chills. Took motrin 400 mg 1 hour ago.

## 2020-10-22 ENCOUNTER — TRANSCRIPTION ENCOUNTER (OUTPATIENT)
Age: 50
End: 2020-10-22

## 2020-10-22 DIAGNOSIS — R21 RASH AND OTHER NONSPECIFIC SKIN ERUPTION: ICD-10-CM

## 2020-10-22 DIAGNOSIS — L30.9 DERMATITIS, UNSPECIFIED: ICD-10-CM

## 2020-10-22 LAB
BLD GP AB SCN SERPL QL: NEGATIVE — SIGNIFICANT CHANGE UP
CRP SERPL-MCNC: 13.7 MG/L — HIGH
ERYTHROCYTE [SEDIMENTATION RATE] IN BLOOD: 51 MM/HR — HIGH (ref 4–25)
NIGHT BLUE STAIN TISS: SIGNIFICANT CHANGE UP
RH IG SCN BLD-IMP: POSITIVE — SIGNIFICANT CHANGE UP
SARS-COV-2 IGG SERPL QL IA: NEGATIVE — SIGNIFICANT CHANGE UP
SARS-COV-2 IGM SERPL IA-ACNC: <0.1 INDEX — SIGNIFICANT CHANGE UP
SARS-COV-2 RNA SPEC QL NAA+PROBE: SIGNIFICANT CHANGE UP
SPECIMEN SOURCE: SIGNIFICANT CHANGE UP
T PALLIDUM AB TITR SER: NEGATIVE — SIGNIFICANT CHANGE UP

## 2020-10-22 PROCEDURE — 99223 1ST HOSP IP/OBS HIGH 75: CPT

## 2020-10-22 RX ORDER — INFLUENZA VIRUS VACCINE 15; 15; 15; 15 UG/.5ML; UG/.5ML; UG/.5ML; UG/.5ML
0.5 SUSPENSION INTRAMUSCULAR ONCE
Refills: 0 | Status: DISCONTINUED | OUTPATIENT
Start: 2020-10-22 | End: 2020-10-27

## 2020-10-22 RX ORDER — ACYCLOVIR SODIUM 500 MG
400 VIAL (EA) INTRAVENOUS THREE TIMES A DAY
Refills: 0 | Status: DISCONTINUED | OUTPATIENT
Start: 2020-10-22 | End: 2020-10-25

## 2020-10-22 RX ADMIN — Medication 600 MILLIGRAM(S): at 12:48

## 2020-10-22 RX ADMIN — Medication 975 MILLIGRAM(S): at 12:48

## 2020-10-22 RX ADMIN — Medication 600 MILLIGRAM(S): at 17:43

## 2020-10-22 RX ADMIN — MORPHINE SULFATE 2 MILLIGRAM(S): 50 CAPSULE, EXTENDED RELEASE ORAL at 01:19

## 2020-10-22 RX ADMIN — Medication 400 MILLIGRAM(S): at 07:03

## 2020-10-22 RX ADMIN — Medication 400 MILLIGRAM(S): at 22:13

## 2020-10-22 RX ADMIN — Medication 1 TABLET(S): at 05:48

## 2020-10-22 RX ADMIN — Medication 600 MILLIGRAM(S): at 18:13

## 2020-10-22 RX ADMIN — Medication 600 MILLIGRAM(S): at 05:45

## 2020-10-22 RX ADMIN — Medication 1 TABLET(S): at 17:43

## 2020-10-22 RX ADMIN — Medication 975 MILLIGRAM(S): at 12:18

## 2020-10-22 RX ADMIN — Medication 600 MILLIGRAM(S): at 12:18

## 2020-10-22 RX ADMIN — MORPHINE SULFATE 2 MILLIGRAM(S): 50 CAPSULE, EXTENDED RELEASE ORAL at 07:34

## 2020-10-22 RX ADMIN — MORPHINE SULFATE 2 MILLIGRAM(S): 50 CAPSULE, EXTENDED RELEASE ORAL at 07:04

## 2020-10-22 RX ADMIN — Medication 975 MILLIGRAM(S): at 17:43

## 2020-10-22 RX ADMIN — Medication 975 MILLIGRAM(S): at 18:13

## 2020-10-22 RX ADMIN — Medication 975 MILLIGRAM(S): at 01:19

## 2020-10-22 RX ADMIN — Medication 975 MILLIGRAM(S): at 05:46

## 2020-10-22 RX ADMIN — Medication 600 MILLIGRAM(S): at 01:20

## 2020-10-22 RX ADMIN — Medication 400 MILLIGRAM(S): at 15:09

## 2020-10-22 NOTE — DISCHARGE NOTE PROVIDER - HOSPITAL COURSE
51yo P0 with no significant PMSH presented to ED (2nd visit) with c/o painful, weeping vulvar lesion. On first visit, HSV serum IgG labs were sent and positive. Pt initiated on Acyclovir and Keflex in outpatient setting; however, lesions persisted without improvement and instead pt pain and rash worsened. She was seen by GYN provider 10/21 at which point bacterial cultures of lesion were collected. Pt subsequently presented to ED for worsening pain and lesions. Repeat HSV cultures were collected in the ED and pt was admitted for workup with Infectious Disease and Dermatology. RPR, HIV, CRP, ESR, LELO labs were sent. Per ID recs, additional HSV/ZVZ cultures were collected by ID; Gonorrhea/Chlamydia NAAT swab of lesion was collected by GYN team; bacterial, AFB, and fungal culture of purulent fluid collected by ID. Per Dermatology recs, infectious > inflammatory preferred; HSV 1/2 VZV PCR sent with continuation of Acyclovir until PCR results.     ____? Derm biopsy    While inpatient, pain was controlled with Tylenol/Motrin and Morphine PRN. She additionally continued to use Lotrisone cream for separate annular lesion between breasts. 49yo P0 with no significant PMSH presented to ED (2nd visit) with c/o painful, weeping vulvar lesion. On first visit, HSV serum IgG labs were sent and positive. Pt initiated on Acyclovir and Keflex in outpatient setting; however, lesions persisted without improvement and instead pt pain and rash worsened. She was seen by GYN provider 10/21 at which point bacterial cultures of lesion were collected. Pt subsequently presented to ED for worsening pain and lesions. Repeat HSV cultures were collected in the ED and pt was admitted for workup with Infectious Disease and Dermatology. RPR, HIV, CRP, ESR, LELO labs were sent. Per ID recs, additional HSV/ZVZ cultures were collected by ID; Gonorrhea/Chlamydia NAAT swab of lesion was collected by GYN team; bacterial, AFB, and fungal culture of purulent fluid collected by ID. Per Dermatology recs, infectious > inflammatory preferred; HSV 1/2 VZV PCR sent with continuation of Acyclovir until PCR results. CT Pelvis was completed 10/23 and noted mild inflammation involving the vulva; no abscess or subcutaneous gas. Upon culture results showing polymicrobial species, pt abx were switched to Unasyn on 10/25. Dermatology bedside biopsy was performed on 10/26. Patient was discharged with 1wk Augmentin 875mg BID. While inpatient, pain was controlled with Tylenol/Motrin and Morphine PRN. She additionally continued to use Lotrisone cream for separate annular lesion between breasts. She will follow-up with Dr. Lion in 1 week.

## 2020-10-22 NOTE — H&P ADULT - NSHPLABSRESULTS_GEN_ALL_CORE
12.2   11.08 )-----------( 368      ( 21 Oct 2020 22:00 )             41.1     10-21    144  |  105  |  17  ----------------------------<  111<H>  4.0   |  29  |  1.04    Ca    9.8      21 Oct 2020 22:00    TPro  7.7  /  Alb  4.2  /  TBili  0.3  /  DBili  x   /  AST  22  /  ALT  23  /  AlkPhos  93  10-21

## 2020-10-22 NOTE — H&P ADULT - ATTENDING COMMENTS
GYN Attending    Agree with above. 49yo P0 with no significant PMH presenting from office after evaluation for vulvar ulcerations.  Previously seen at [facility] ED for the same, now with worsening pain and spread of ulcerative rash despite acyclovir and keflex.  Last sexual contact in 11/2019.  No h/o STDs.  No FH of autoimmune d/o.  Rash inconsistent with HSV GYN Attending    Agree with above. 51yo P0 with no significant PMH presenting from office after evaluation for vulvar ulcerations.  Previously seen at [facility] ED for the same, now with worsening pain and spread of ulcerative rash despite acyclovir and keflex.  Pain not well controlled with motrin and percocet.      Last sexual contact in 11/2019.  No h/o STDs.  No FH of autoimmune d/o.  Rash inconsistent with HSV GYN Attending    Agree with above. 49yo P0 with no significant PMH presenting from office after evaluation for vulvar ulcerations.  Previously seen at [facility] ED for the same, now with worsening pain and spread of ulcerative rash despite acyclovir and keflex.  Pain not well controlled with motrin and percocet.  Pt unable to tolerate even the sheet touching her.    Last sexual contact in 11/2019.  No h/o STDs.  Recent HSV1/2 serology positive, but no history of outbreaks.  No FH of autoimmune d/o.      On exam 2cm annular lesion in central chest between breasts.  Macular rash on B/L inner thighs.  Mons pubis and labia majora erythematous with multiple ulcerative lesions 0.5-1cm in diameter.  Weeping.  HSV culture obtained at bedside.  Ulcer that was swabbed is approximately 0.5cm deep.  Bacterial cx was previously obtained in office earlier tonight and sent to [facility].  Exquisitely tender.  Unable to evaluate vagina due to pt's inability to tolerate speculum due to pain.     Pt with multiple genital ulcerative lesions in background of erythema, unknown etiology.  HSV serology positive, however, ulcerations larger than typical HSV lesions.  WBC count elevated, no left shift.  DDX includes HSV with superinfection, impetigo, pemphigoid, pyoderma, erythema multiforme.  Plan for admission for IV pain medications.  Will continue acyclovir.  Abx will be changed to bactrim. Dermatology, ID consult in the AM.  Discussed with pt possible need for biopsy for further evaluation, once better pain control obtained.    MD Sharon GYN Attending    Agree with above. 49yo P0 with no significant PMH presenting from office after evaluation for vulvar ulcerations.  Previously seen at [facility] ED for the same, now with worsening pain and spread of ulcerative rash despite acyclovir and keflex.  Pain not well controlled with motrin and percocet.  Pt unable to tolerate even the sheet touching her.    Last sexual contact in 11/2019.  No h/o STDs.  Recent HSV1/2 serology positive, but no history of outbreaks.  No FH of autoimmune d/o.      On exam 2cm annular lesion in central chest between breasts.  Macular rash on B/L inner thighs.  Mons pubis and labia majora erythematous with multiple ulcerative lesions 0.5-1cm in diameter.  Weeping.  HSV culture obtained at bedside.  Ulcer that was swabbed is approximately 0.5cm deep.  Bacterial cx was previously obtained in office earlier tonight and sent to [facility].  Exquisitely tender.  Unable to evaluate vagina due to pt's inability to tolerate speculum due to pain.     Pt with multiple genital ulcerative lesions in background of erythema, unknown etiology.  HSV serology positive, however, ulcerations larger than typical HSV lesions.  WBC count elevated, no left shift.  DDX includes HSV with superinfection, impetigo, SLE, pemphigoid, pyoderma, erythema multiforme.  Plan for admission for IV pain medications.  Will continue acyclovir.  Abx will be changed to bactrim. Dermatology, ID consult in the AM.  Discussed with pt possible need for biopsy for further evaluation, once better pain control obtained.    MD Sharon

## 2020-10-22 NOTE — H&P ADULT - NSHPREVIEWOFSYSTEMS_GEN_ALL_CORE
REVIEW OF SYSTEMS:  CONSTITUTIONAL: No weakness, fevers or chills  EYES/ENT: No visual changes  NECK: No pain or stiffness  RESPIRATORY: No cough, wheezing; No shortness of breath  CARDIOVASCULAR: No chest pain or palpitations  GASTROINTESTINAL: No abdominal or epigastric pain. No nausea, vomiting; No diarrhea or constipation  GENITOURINARY: No dysuria, frequency or hematuria  NEUROLOGICAL: No headache, LOC  All other review of systems is negative unless indicated above CONSTITUTIONAL: No weakness, fevers or chills  EYES/ENT: No visual changes  NECK: No pain or stiffness  RESPIRATORY: No cough, wheezing; No shortness of breath  CARDIOVASCULAR: No chest pain or palpitations  GASTROINTESTINAL: No abdominal or epigastric pain. No nausea, vomiting; No diarrhea or constipation  GENITOURINARY: See HPI  SKIN: See HPI  NEUROLOGICAL: No headache, LOC  All other review of systems is negative unless indicated above

## 2020-10-22 NOTE — PROGRESS NOTE ADULT - ASSESSMENT
49yo P0 with no significant PMSH a/w worsening pain from vulvar ulcerations of unknown etiology despite Acyclovir and Keflex outpatient. Patient with moderate pain control at this time.

## 2020-10-22 NOTE — CONSULT NOTE ADULT - SUBJECTIVE AND OBJECTIVE BOX
HPI:  49 yo F with hx of fibroids s/p myomectomy who presented to ED for painful ulcerations of the mons pubis. Patient reports having single "ring worm" like rash on central chest 3 weeks ago between breast. She bought OTC lotrimin and treated the area. Shortly after she noticed similar rashes in bilateral inner thighs and used lotrimin in these areas as well. She then noticed the appearance of white pus like bumps on the mons pubis. These began to grow in size. Last wednesday she saw her PCP who started her on cephalexin PO and topical mupirocin. The rash continued to get worse, prompting her to come to the ED this past friday due to increased pain and ulceration. She was started on acyclovir and continued on oral cephalexin and discharged home. The rash continued to develop deeper ulcerations and exquisite pain and she presented to the ED 10/21 for further eval.    Derm consulted for these ulcerations. Per patient, denies any history of STDs. She has not been sexually active since 11/2019 in which she had unprotected sex with man from [facility]. No fevers, chills. No pain with urination.     Per chart review, patient lives at home with 2 children. Has pet dog and guinea pig.     OB/GYN HISTORY:     (+) hx of fibroids, status post myomectomy  Pt denies any hx of endometriosis, known ovarian cyst, STIs, abnormal pap smears, breast/uterine/ovarian cancer                                          Allergies:  No Known Drug Allergies/Intolerances      PAST MEDICAL & SURGICAL HISTORY:  No pertinent past medical history  H/O myomectomy   (22 Oct 2020 00:36)      PAST MEDICAL & SURGICAL HISTORY:  No pertinent past medical history    H/O myomectomy        REVIEW OF SYSTEMS      General: no fevers/chills, no lethargy	    Skin/Breast: see HPI  	  Ophthalmologic: no eye pain or change in vision  	  ENMT: no dysphagia or change in hearing    Respiratory and Thorax: no SOB or cough  	  Cardiovascular: no palpitations or chest pain    Gastrointestinal: no abdominal pain or blood in stool     Genitourinary: no dysuria or frequency    Musculoskeletal: no joint pains or weakness	    Neurological: no weakness, numbness , or tingling    MEDICATIONS  (STANDING):  acetaminophen   Tablet .. 975 milliGRAM(s) Oral every 6 hours  acyclovir   Oral Tab/Cap 400 milliGRAM(s) Oral three times a day  clotrimazole 1% Cream 1 Application(s) Topical two times a day  ibuprofen  Tablet. 600 milliGRAM(s) Oral every 6 hours  influenza   Vaccine 0.5 milliLiter(s) IntraMuscular once  trimethoprim  160 mG/sulfamethoxazole 800 mG 1 Tablet(s) Oral two times a day    MEDICATIONS  (PRN):  morphine  - Injectable 2 milliGRAM(s) IV Push every 4 hours PRN Severe Pain (7 - 10)      Allergies    No Known Allergies    Intolerances        SOCIAL HISTORY:    FAMILY HISTORY:      Vital Signs Last 24 Hrs  T(C): 36.3 (22 Oct 2020 14:54), Max: 37.1 (21 Oct 2020 20:43)  T(F): 97.4 (22 Oct 2020 14:54), Max: 98.8 (22 Oct 2020 01:42)  HR: 58 (22 Oct 2020 14:54) (58 - 78)  BP: 127/76 (22 Oct 2020 14:54) (118/60 - 157/86)  BP(mean): --  RR: 18 (22 Oct 2020 14:54) (16 - 18)  SpO2: 98% (22 Oct 2020 14:54) (98% - 100%)    PHYSICAL EXAM:     The patient was alert and oriented X 3, well nourished, and in no  apparent distress.  OP showed no ulcerations  There was no visible lymphadenopathy.  Conjunctiva were non injected  There was no clubbing or edema of extremities.  The scalp, hair, face, eyebrows, lips, OP, neck, chest, back,   extremities X 4, nails were examined.  There was no hyperhidrosis or bromhidrosis.    Of note on skin exam:   Large pink plaque on mons pubis with numerous overlying punched out erosions and ulcerations; some drainage noted. Extremely painful to palpation. No notable inguinal lymphadenopathy.     LABS:                        12.2   11.08 )-----------( 368      ( 21 Oct 2020 22:00 )             41.1     10-21    144  |  105  |  17  ----------------------------<  111<H>  4.0   |  29  |  1.04    Ca    9.8      21 Oct 2020 22:00    TPro  7.7  /  Alb  4.2  /  TBili  0.3  /  DBili  x   /  AST  22  /  ALT  23  /  AlkPhos  93  10-21          RADIOLOGY & ADDITIONAL STUDIES:           HPI:  49 yo F with hx of fibroids s/p myomectomy who presented to ED for painful ulcerations of the mons pubis. Patient reports having single "ring worm" like rash on central chest 3 weeks ago between breast. She bought OTC lotrimin and treated the area. Shortly after she noticed similar rashes in bilateral inner thighs and used lotrimin in these areas as well. She then noticed the appearance of white pus like bumps on the mons pubis. These began to grow in size. Last wednesday she saw her PCP who started her on cephalexin PO and topical mupirocin. The rash continued to get worse, prompting her to come to the ED this past friday due to increased pain and ulceration. She was started on acyclovir and continued on oral cephalexin and discharged home. The rash continued to develop deeper ulcerations and exquisite pain and she presented to the ED 10/21 for further eval.    Derm consulted for these ulcerations. Per patient, denies any history of STDs. She has not been sexually active since 11/2019 in which she had unprotected sex with man from [facility]. No fevers, chills. No pain with urination.     Per chart review, patient lives at home with 2 children. Has pet dog and guinea pig.     OB/GYN HISTORY:     (+) hx of fibroids, status post myomectomy  Pt denies any hx of endometriosis, known ovarian cyst, STIs, abnormal pap smears, breast/uterine/ovarian cancer                                          Allergies:  No Known Drug Allergies/Intolerances      PAST MEDICAL & SURGICAL HISTORY:  No pertinent past medical history  H/O myomectomy   (22 Oct 2020 00:36)      PAST MEDICAL & SURGICAL HISTORY:  No pertinent past medical history    H/O myomectomy        REVIEW OF SYSTEMS      General: no fevers/chills, no lethargy	    Skin/Breast: see HPI  	  Ophthalmologic: no eye pain or change in vision  	  ENMT: no dysphagia or change in hearing    Respiratory and Thorax: no SOB or cough  	  Cardiovascular: no palpitations or chest pain    Gastrointestinal: no abdominal pain or blood in stool     Genitourinary: no dysuria or frequency    Musculoskeletal: no joint pains or weakness	    Neurological: no weakness, numbness , or tingling    MEDICATIONS  (STANDING):  acetaminophen   Tablet .. 975 milliGRAM(s) Oral every 6 hours  acyclovir   Oral Tab/Cap 400 milliGRAM(s) Oral three times a day  clotrimazole 1% Cream 1 Application(s) Topical two times a day  ibuprofen  Tablet. 600 milliGRAM(s) Oral every 6 hours  influenza   Vaccine 0.5 milliLiter(s) IntraMuscular once  trimethoprim  160 mG/sulfamethoxazole 800 mG 1 Tablet(s) Oral two times a day    MEDICATIONS  (PRN):  morphine  - Injectable 2 milliGRAM(s) IV Push every 4 hours PRN Severe Pain (7 - 10)      Allergies    No Known Allergies    Intolerances        SOCIAL HISTORY: denies drug use    FAMILY HISTORY: no FH of cutaneous diseases      Vital Signs Last 24 Hrs  T(C): 36.3 (22 Oct 2020 14:54), Max: 37.1 (21 Oct 2020 20:43)  T(F): 97.4 (22 Oct 2020 14:54), Max: 98.8 (22 Oct 2020 01:42)  HR: 58 (22 Oct 2020 14:54) (58 - 78)  BP: 127/76 (22 Oct 2020 14:54) (118/60 - 157/86)  BP(mean): --  RR: 18 (22 Oct 2020 14:54) (16 - 18)  SpO2: 98% (22 Oct 2020 14:54) (98% - 100%)    PHYSICAL EXAM:     The patient was alert and oriented X 3, well nourished, and in no  apparent distress.  OP showed no ulcerations  There was no visible lymphadenopathy.  Conjunctiva were non injected  There was no clubbing or edema of extremities.  The scalp, hair, face, eyebrows, lips, OP, neck, chest, back,   extremities X 4, nails were examined.  There was no hyperhidrosis or bromhidrosis.    Of note on skin exam:   Large pink plaque on mons pubis studded with pustules and with numerous overlying punched out erosions and ulcerations. Extremely painful to palpation. No notable inguinal lymphadenopathy.     LABS:                        12.2   11.08 )-----------( 368      ( 21 Oct 2020 22:00 )             41.1     10-21    144  |  105  |  17  ----------------------------<  111<H>  4.0   |  29  |  1.04    Ca    9.8      21 Oct 2020 22:00    TPro  7.7  /  Alb  4.2  /  TBili  0.3  /  DBili  x   /  AST  22  /  ALT  23  /  AlkPhos  93  10-21          RADIOLOGY & ADDITIONAL STUDIES: no relevant studies

## 2020-10-22 NOTE — H&P ADULT - NSHPPHYSICALEXAM_GEN_ALL_CORE
Vital Signs Last 24 Hrs  T(C): 37.1 (21 Oct 2020 20:43), Max: 37.1 (21 Oct 2020 20:43)  T(F): 98.7 (21 Oct 2020 20:43), Max: 98.7 (21 Oct 2020 20:43)  HR: 78 (21 Oct 2020 20:43) (78 - 78)  BP: 141/95 (21 Oct 2020 20:43) (141/95 - 141/95)  BP(mean): --  RR: 18 (21 Oct 2020 20:43) (18 - 18)  SpO2: 100% (21 Oct 2020 20:43) (100% - 100%)    Constitutional: NAD, awake and alert, well-developed  HEENT: Normal Hearing,  Neck: Soft and supple  Respiratory: Breath sounds are clear bilaterally, No wheezing, rales or rhonchi  Cardiovascular: S1 and S2, regular rate and rhythm, no Murmurs, gallops or rubs  Gastrointestinal: Bowel Sounds present, soft, nontender, nondistended, no guarding, no rebound  Genitourinary: vaginal bleeding***, cervical os ***, nontender on bimaniual exam    Extremities: No peripheral edema  Vascular: 2+ peripheral pulses  Neurological: A/O x 3, no focal deficits  Skin: No rashes Vital Signs Last 24 Hrs  T(C): 37.1 (21 Oct 2020 20:43), Max: 37.1 (21 Oct 2020 20:43)  T(F): 98.7 (21 Oct 2020 20:43), Max: 98.7 (21 Oct 2020 20:43)  HR: 78 (21 Oct 2020 20:43) (78 - 78)  BP: 141/95 (21 Oct 2020 20:43) (141/95 - 141/95)  BP(mean): --  RR: 18 (21 Oct 2020 20:43) (18 - 18)  SpO2: 100% (21 Oct 2020 20:43) (100% - 100%)    Constitutional: NAD, awake and alert, well-developed, visibly uncomfortable   HEENT: Normal Hearing  Neck: Soft and supple  Respiratory: Breath sounds are clear bilaterally, No wheezing, rales or rhonchi  Cardiovascular: S1 and S2, regular rate and rhythm, no Murmurs, gallops or rubs  Chest: 2.5cm annular dermatophytic lesion directly over the xiphoid    Genitourinary: ulcerated 0.5cm pustular punctate lesions diffusely dispersed over  mons pubis, bilateral labia majora, and inner groin, exquisitely tender to touch, pt unable to tolerate touch, HSV wound culture obtained  SKIN: see above, in addition fine flesh colored 0.1cm papules sporadically dispersed in clusters over patient's chest, legs, arms, back,   Extremities: No peripheral edema  Neurological: A/O x 3, no focal deficits  Skin: No rashes

## 2020-10-22 NOTE — PROGRESS NOTE ADULT - SUBJECTIVE AND OBJECTIVE BOX
HD# 1    Patient seen and examined at bedside. Recently moved to room from ED. No acute complaints, continues to report discomfort from mons lesions. Trying to avoid disturbing area or moving as that exacerbates discomfort. Feels stickiness between thighs 2/2 weeping lesions.     Vital Signs Last 24 Hours  T(C): 36.4 (10-22-20 @ 06:30), Max: 37.1 (10-21-20 @ 20:43)  HR: 65 (10-22-20 @ 07:02) (58 - 78)  BP: 130/80 (10-22-20 @ 07:02) (130/80 - 157/86)  RR: 16 (10-22-20 @ 06:30) (16 - 18)  SpO2: 100% (10-22-20 @ 06:30) (100% - 100%)    Physical Exam:  General: NAD, resting in bed  Pulm: nonlabored breathing  : multiple 0.5cm weeping vulvar excoriations/pustules on left portion of mons  Ext: No pain or swelling    Labs:                        12.2   11.08 )-----------( 368      ( 21 Oct 2020 22:00 )             41.1   baso 0.6    eos 3.8    imm gran 0.4    lymph 30.7   mono 6.7    poly 57.8       MEDICATIONS  (STANDING):  acetaminophen   Tablet .. 975 milliGRAM(s) Oral every 6 hours  acyclovir   Oral Tab/Cap 400 milliGRAM(s) Oral three times a day  ibuprofen  Tablet. 600 milliGRAM(s) Oral every 6 hours  influenza   Vaccine 0.5 milliLiter(s) IntraMuscular once  trimethoprim  160 mG/sulfamethoxazole 800 mG 1 Tablet(s) Oral two times a day    MEDICATIONS  (PRN):  morphine  - Injectable 2 milliGRAM(s) IV Push every 4 hours PRN Severe Pain (7 - 10)

## 2020-10-22 NOTE — DISCHARGE NOTE PROVIDER - NSDCMRMEDTOKEN_GEN_ALL_CORE_FT
acetaminophen-oxyCODONE 325 mg-5 mg oral tablet: 1 tab(s) orally every 6 hours, As Needed -for severe pain MDD:4  acyclovir 400 mg oral tablet: 1 tab(s) orally 3 times a day    acetaminophen 325 mg oral tablet: 3 tab(s) orally every 6 hours  Augmentin 875 mg-125 mg oral tablet: 1 milligram(s) orally every 12 hours   ibuprofen 600 mg oral tablet: 1 tab(s) orally every 6 hours

## 2020-10-22 NOTE — DISCHARGE NOTE PROVIDER - NSDCCPCAREPLAN_GEN_ALL_CORE_FT
PRINCIPAL DISCHARGE DIAGNOSIS  Diagnosis: Rash and nonspecific skin eruption  Assessment and Plan of Treatment:

## 2020-10-22 NOTE — DISCHARGE NOTE PROVIDER - CARE PROVIDER_API CALL
Grisel Lion  OBSTETRICS AND GYNECOLOGY  [address] [place] [address]  Phone: [phone]  Fax: [phone]  Follow Up Time:

## 2020-10-22 NOTE — CONSULT NOTE ADULT - SUBJECTIVE AND OBJECTIVE BOX
Patient is a 50y old  Female who presents with a chief complaint of vulvar excoriations (22 Oct 2020 07:16)    HPI:  LEV GARLAND is a 50y who presents with a chief complaint of painful vulvar lesion. Pt states approximately 3w ago she noticed what she believes was ringworm on her upper abdomen and used OTC cream in an attempt to resolve the infections. It did not resolve, instead she noticed the onset of two additional skin changes, one she describes as similar in origin to the lesion on her chest evolving on her upper thighs/groin/vulvar region that was initially itchy but soon became painful pustules which have now ruptured and are raw and oozing. She has been taking ibuprofen and oxy prn for pain. The second skin change she describes as a rash that she's noticed also on her inner thighs that are small flesh colored papular lesions. She has been seen both in the ED and by her primary ObGyn for this problem on 10/16, started on acyclovir and keflex, and has seen no resolution only worsening of her sx. 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      OB/GYN HISTORY:     (+) hx of fibroids, status post myomectomy  Pt denies any hx of endometriosis, known ovarian cyst, STIs, abnormal pap smears, breast/uterine/ovarian cancer                                          Allergies:  No Known Drug Allergies/Intolerances      PAST MEDICAL & SURGICAL HISTORY:  No pertinent past medical history  H/O myomectomy   (22 Oct 2020 00:36)       prior hospital charts reviewed [V]  primary team notes reviewed [V]  other consultant notes reviewed [V]    PAST MEDICAL & SURGICAL HISTORY:  No pertinent past medical history    H/O myomectomy        SOCIAL HISTORY:    - Denied smoking/vaping/alcohol/recreational drug use    FAMILY HISTORY:      Allergies  No Known Allergies        ANTIMICROBIALS:  acyclovir   Oral Tab/Cap 400 three times a day  trimethoprim  160 mG/sulfamethoxazole 800 mG 1 two times a day      ANTIMICROBIALS (past 90 days):  MEDICATIONS  (STANDING):  acyclovir   Oral Tab/Cap   400 milliGRAM(s) Oral (10-22-20 @ 07:03)    trimethoprim  160 mG/sulfamethoxazole 800 mG   1 Tablet(s) Oral (10-22-20 @ 05:48)        OTHER MEDS:   MEDICATIONS  (STANDING):  acetaminophen   Tablet .. 975 every 6 hours  ibuprofen  Tablet. 600 every 6 hours  influenza   Vaccine 0.5 once  morphine  - Injectable 2 every 4 hours PRN      REVIEW OF SYSTEMS  [  ] ROS unobtainable because:    [  ] All other systems negative except as noted below:	    Constitutional:  [ ] fever [ ] chills  [ ] weight loss  [ ] weakness  Skin:  [ ] rash [ ] phlebitis	  Eyes: [ ] icterus [ ] pain  [ ] discharge	  ENMT: [ ] sore throat  [ ] thrush [ ] ulcers [ ] exudates  Respiratory: [ ] dyspnea [ ] hemoptysis [ ] cough [ ] sputum	  Cardiovascular:  [ ] chest pain [ ] palpitations [ ] edema	  Gastrointestinal:  [ ] nausea [ ] vomiting [ ] diarrhea [ ] constipation [ ] pain	  Genitourinary:  [ ] dysuria [ ] frequency [ ] hematuria [ ] discharge [ ] flank pain  [ ] incontinence  Musculoskeletal:  [ ] myalgias [ ] arthralgias [ ] arthritis  [ ] back pain  Neurological:  [ ] headache [ ] seizures  [ ] confusion/altered mental status  Psychiatric:  [ ] anxiety [ ] depression	  Hematology/Lymphatics:  [ ] lymphadenopathy  Endocrine:  [ ] adrenal [ ] thyroid  Allergic/Immunologic:	 [ ] transplant [ ] seasonal    VITALS:  Vital Signs Last 24 Hrs  T(F): 97.5 (10-22-20 @ 06:30), Max: 99.9 (10-16-20 @ 17:22)    Vital Signs Last 24 Hrs  HR: 65 (10-22-20 @ 07:02) (58 - 78)  BP: 130/80 (10-22-20 @ 07:02) (130/80 - 157/86)  RR: 16 (10-22-20 @ 06:30)  SpO2: 100% (10-22-20 @ 06:30) (100% - 100%)  Wt(kg): --    EXAM:  GA: NAD  HEENT: oral cavity no lesion  CV: nl S1/S2, no RMG  Lungs: CTAB  Abd: BS+, soft, nontender, no rebounding pain  Ext: no edema  Skin:  IV: no phlebitis    Labs:                        12.2   11.08 )-----------( 368      ( 21 Oct 2020 22:00 )             41.1     10-21    144  |  105  |  17  ----------------------------<  111<H>  4.0   |  29  |  1.04    Ca    9.8      21 Oct 2020 22:00    TPro  7.7  /  Alb  4.2  /  TBili  0.3  /  DBili  x   /  AST  22  /  ALT  23  /  AlkPhos  93  10-21      WBC Trend:  WBC Count: 11.08 (10-21-20 @ 22:00)      Auto Neutrophil #: 6.41 K/uL (10-21-20 @ 22:00)  Auto Neutrophil #: 12.00 K/uL (10-16-20 @ 18:50)      Creatine Trend:  Creatinine, Serum: 1.04 (10-21)  Creatinine, Serum: 0.78 (10-16)      Liver Biochemical Testing Trend:  Alanine Aminotransferase (ALT/SGPT): 23 (10-21)  Alanine Aminotransferase (ALT/SGPT): 28 (10-16)  Aspartate Aminotransferase (AST/SGOT): 22 (10-21-20 @ 22:00)  Aspartate Aminotransferase (AST/SGOT): 42 (10-16-20 @ 18:50)  Bilirubin Total, Serum: 0.3 (10-21)  Bilirubin Total, Serum: 0.5 (10-16)    Auto Eosinophil %: 3.8 % (10-21-20 @ 22:00)    MICROBIOLOGY:  HIV Combo Result: NonReact (10.21.20 @ 22:00)    Herpes simplex (1,2) IgG, Serum (10.16.20 @ 18:50)    Herpes simplex 1 IgG Ab Interp: Positive    Herpes simplex 2 IgG Ab Interp: Positive        OTHER TESTS:  COVID-19 PCR: NotDetec (10-22-20 @ 02:31)  Sedimentation Rate, Erythrocyte: 51 (10-22 @ 04:33)  C-Reactive Protein, Serum: 13.7 (10-22 @ 04:33)  A1C with Estimated Average Glucose: 5.1 % (10-16-20 @ 18:50)     Patient is a 50y old  Female who presents with a chief complaint of vulvar excoriations (22 Oct 2020 07:16)    HPI:  49 yo F with fibroids s/p myomectomy presents with left sided pubic lesions for 4 days. It started on 10/19 night, getting larger the next day. There is draining pus, foul smelling liquid and freshly pain lesion underneath. She denies hx of STI. She is originally from [facility] [place] [facility], came [place] at 15 [place]. Last sex was in 2019, unprotected sex with a man who is from [facility]. 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 She lives with 2 children (19 yo F and 7 yo M). She has a dog and a guinea pig.     She developed ringworm on her chest 3 weeks ago that she applied OTC antifungal with some improvement but did not go away yet.    prior hospital charts reviewed [V]  primary team notes reviewed [V]  other consultant notes reviewed [V]    PAST MEDICAL & SURGICAL HISTORY:  No pertinent past medical history  H/O myomectomy      SOCIAL HISTORY:    - Denied smoking/vaping/alcohol/recreational drug use  - She is originally from [facility] [place] [facility] [place], came to [place] at 15 yo. Last sex was in 2019, unprotected sex with a man who is from [facility]. 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 She lives with 2 children (19 yo F and 7 yo M). She has a dog and a guinea pig.     FAMILY HISTORY:  Mother-HTN  Father- in  from neck cancer    Allergies  No Known Allergies      ANTIMICROBIALS:  acyclovir   Oral Tab/Cap 400 three times a day  trimethoprim  160 mG/sulfamethoxazole 800 mG 1 two times a day      ANTIMICROBIALS (past 90 days):  MEDICATIONS  (STANDING):  acyclovir   Oral Tab/Cap   400 milliGRAM(s) Oral (10-22-20 @ 07:03)    trimethoprim  160 mG/sulfamethoxazole 800 mG   1 Tablet(s) Oral (10-22-20 @ 05:48)      OTHER MEDS:   MEDICATIONS  (STANDING):  acetaminophen   Tablet .. 975 every 6 hours  ibuprofen  Tablet. 600 every 6 hours  influenza   Vaccine 0.5 once  morphine  - Injectable 2 every 4 hours PRN      REVIEW OF SYSTEMS  [  ] ROS unobtainable because:    [V  ] All other systems negative except as noted below:	    Constitutional:  [ ] fever [ ] chills  [ ] weight loss  [ ] weakness  Skin:  [V ] rash [ ] phlebitis	  Eyes: [ ] icterus [ ] pain  [ ] discharge	  ENMT: [ ] sore throat  [ ] thrush [ ] ulcers [ ] exudates  Respiratory: [ ] dyspnea [ ] hemoptysis [ ] cough [ ] sputum	  Cardiovascular:  [ ] chest pain [ ] palpitations [ ] edema	  Gastrointestinal:  [ ] nausea [ ] vomiting [ ] diarrhea [ ] constipation [ ] pain	  Genitourinary:  [ V] dysuria [ ] frequency [ ] hematuria [ ] discharge [ ] flank pain  [ ] incontinence  Musculoskeletal:  [ ] myalgias [ ] arthralgias [ ] arthritis  [ ] back pain  Neurological:  [ ] headache [ ] seizures  [ ] confusion/altered mental status  Psychiatric:  [ ] anxiety [ ] depression	  Hematology/Lymphatics:  [ ] lymphadenopathy  Endocrine:  [ ] adrenal [ ] thyroid  Allergic/Immunologic:	 [ ] transplant [ ] seasonal    VITALS:  Vital Signs Last 24 Hrs  T(F): 97.5 (10-22-20 @ 06:30), Max: 99.9 (10-16-20 @ 17:22)    Vital Signs Last 24 Hrs  HR: 65 (10-22-20 @ 07:02) (58 - 78)  BP: 130/80 (10-22-20 @ 07:02) (130/80 - 157/86)  RR: 16 (10-22-20 @ 06:30)  SpO2: 100% (10-22-20 @ 06:30) (100% - 100%)  Wt(kg): --    EXAM:  GA: NAD  HEENT: oral cavity no lesion  CV: nl S1/S2, no RMG  Lungs: CTAB  Abd: BS+, soft, nontender, no rebounding pain  Ext: no edema  Skin: there is a 3cm ringworm on her anterior chest. There is draining pus, foul smelling liquid and freshly pain lesion underneath the left side pubic area, not inside the vagina or labia.  IV: no phlebitis    Labs:                        12.2   11.08 )-----------( 368      ( 21 Oct 2020 22:00 )             41.1     10-21    144  |  105  |  17  ----------------------------<  111<H>  4.0   |  29  |  1.04    Ca    9.8      21 Oct 2020 22:00    TPro  7.7  /  Alb  4.2  /  TBili  0.3  /  DBili  x   /  AST  22  /  ALT  23  /  AlkPhos  93  10-21      WBC Trend:  WBC Count: 11.08 (10-21-20 @ 22:00)      Auto Neutrophil #: 6.41 K/uL (10-21-20 @ 22:00)  Auto Neutrophil #: 12.00 K/uL (10-16-20 @ 18:50)      Creatine Trend:  Creatinine, Serum: 1.04 (10-21)  Creatinine, Serum: 0.78 (10-16)      Liver Biochemical Testing Trend:  Alanine Aminotransferase (ALT/SGPT): 23 (10-21)  Alanine Aminotransferase (ALT/SGPT): 28 (10-16)  Aspartate Aminotransferase (AST/SGOT): 22 (10-21-20 @ 22:00)  Aspartate Aminotransferase (AST/SGOT): 42 (10-16-20 @ 18:50)  Bilirubin Total, Serum: 0.3 (10-21)  Bilirubin Total, Serum: 0.5 (10-16)    Auto Eosinophil %: 3.8 % (10-21-20 @ 22:00)    MICROBIOLOGY:  HIV Combo Result: NonReact (10.21.20 @ 22:00)    Herpes simplex (1,2) IgG, Serum (10.16.20 @ 18:50)    Herpes simplex 1 IgG Ab Interp: Positive    Herpes simplex 2 IgG Ab Interp: Positive        OTHER TESTS:  COVID-19 PCR: NotDetec (10-22-20 @ 02:31)  Sedimentation Rate, Erythrocyte: 51 (10-22 @ 04:33)  C-Reactive Protein, Serum: 13.7 (10-22 @ 04:33)  A1C with Estimated Average Glucose: 5.1 % (10-16-20 @ 18:50)

## 2020-10-22 NOTE — CONSULT NOTE ADULT - ASSESSMENT
#Ulcerations of mons pubis  Given clinical appearance and rapid onset of numerous punched out painful ulcerations and erosions, favor infectious etiology to rash  Leading diagnosis would be herpes simplex, including both HSV 2 or less likely HSV 1  Lesional HSV 1/2 VZV PCR performed and sent stat  Would recommend continuation of acyclovir until PCR returns  Lower on ddx but still plausible would include deep fungal infection such as Majocchi's granuloma caused by spread of fungal organisms usually from animal source such as dog  DDx also includes other STDs caused by Haemophilus ducreyi and Klebsiella granulomatis   Agree with ID plan to obtain bacterial cultures, fungal culture and AFB cultures of lesions. Will be performed by ID at bedside.  Dermatology will follow work up. If no causative organism identified on swabs and cultures, can consider punch biopsy for H+E and tissue culture if patient ammenable but only after work up above performed.    Dermatology will continue to chart check and monitor patient for progression    Pilo Sousa MD  Resident Physician, PGY3  [facility]  Pager: [phone]  Office: [phone]    The patient's chart was reviewed in addition to being seen and examined at bedside with the dermatology attending Dr. De La Garza  Recommendations were communicated with the primary team.  Please page [phone] for further related questions.   #Ulcerations of mons pubis  Favor infectious etiologies > inflammatory etiologies  Lesional HSV 1/2 VZV PCR performed and sent stat  Would recommend continuation of acyclovir until PCR returns  Differential includes bacterial infection (such as sycosis, or a deep hair follicle bacterial infection) vs. fungal infection (such as majocchi's granuloma)  Can consider atypical STIs or infections such as atypical mycobacterial infection   Agree with ID plan to obtain bacterial cultures, fungal culture and AFB cultures of lesions. Will be performed by ID at bedside.  Dermatology will follow work up. If no causative organism identified on swabs and cultures, can consider punch biopsy for H+E and tissue culture if patient amenable    Dermatology will continue to chart check and monitor patient for progression    Pilo Sousa MD  Resident Physician, PGY3  [facility]  Pager: [phone]  Office: [phone]    The patient's chart was reviewed in addition to being seen and examined at bedside with the dermatology attending Dr. De La Garza  Recommendations were communicated with the primary team.  Please page [phone] for further related questions.

## 2020-10-22 NOTE — H&P ADULT - ASSESSMENT
Differential includes: Lupus, HSV, Syphilis, Behcet's disease, erosive lichen planus, pyoderma gangrenosum, hidradentitis suppurativ    admit for IV abx and pain management LEV GARLAND is a 50y P0 who presented with a chief complaint of painful vulvar lesions lesions despite approximately 1w of acyclovir and Keflex and po analgesia with ibuprofen and oxy at home. Physical exam notable for 3 different skin lesions, one chest lesion likely fungal in origin, fine punctate flesh colored lesion sporadically dispersed over patient's legs, arms, and trunk, and most concerning - ulcerated punctate lesions esquistley painful to the patient. 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 Differential includes: Lupus, HSV with superimposed bacterial infection, Syphilis, Behcet's disease, erosive lichen planus, pyoderma gangrenosum, hidradentitis suppurativa, impetigo, and muliforme erythema,. Derm consulted, appreciate recs. Pt will be admitted for IV abx, pain management, and further work up.     Pt seen and assessed with chief, ALETHA Valenzuela and attending Dr. Wise.    Olivia Law MD  OBGYN PGY2 LEV GARLAND is a 50y P0 who presented with a chief complaint of painful vulvar lesions lesions despite approximately 1w of acyclovir and Keflex and po analgesia with ibuprofen and oxy at home. Physical exam notable for 3 different skin lesions, one chest lesion likely fungal in origin, fine punctate flesh colored lesion sporadically dispersed over patient's legs, arms, and trunk, and most concerning - ulcerated punctate lesions esquistley painful to the patient. 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 Differential includes: Lupus, HSV with superimposed bacterial infection, Syphilis, Behcet's disease, erosive lichen planus, pyoderma gangrenosum, hidradentitis suppurativa, impetigo, and muliforme erythema,. Derm and ID consulted, appreciate recs. Pt will be admitted for IV abx, pain management, and further work up.     Pt seen and assessed with chief, ALETHA Valenzuela and attending Dr. Wise.    Olivia Law MD  OBGYN PGY2

## 2020-10-22 NOTE — CHART NOTE - NSCHARTNOTEFT_GEN_A_CORE
R2 GYN CHART NOTE    Consulted Derm, ID, Wound Care @0840.  ID @ ~ 0900, d/w Dr. Lin: to see pt later today  Derm @ 0940, d/w Kaz Sousa (Derm resident): to see pt this afternoon  Wound care call back pending, will call back.    Kira Hammond R2

## 2020-10-22 NOTE — H&P ADULT - HISTORY OF PRESENT ILLNESS
**INCOMPLETE***    LEV GARLAND  50y  Female G*P* who presents with a chief complaint of       OB/GYN HISTORY:     LMP  G P   Pt denies any hx of fibroids, endometriosis, known ovarian cyst, STIs or abnormal pap smears                                            Allergies:  No Known Drug Allergies/Intolerances        PAST MEDICAL & SURGICAL HISTORY:  No pertinent past medical history  H/O myomectomy               LEV GARLAND is a 50y who presents with a chief complaint of painful vulvar lesion. Pt states approximately 3w ago she noticed what she believes was ringworm on her upper abdomen and used OTC cream in an attempt to resolve the infections. It did not resolve, instead she noticed the onset of two additional skin changes, one she describes as similar in origin to the lesion on her chest evolving on her upper thighs/groin/vulvar region that was initially itchy but soon became painful pustules which have now ruptured and are raw and oozing. She has been taking ibuprofen and oxy prn for pain. The second skin change she describes as a rash that she's noticed also on her inner thighs that are small flesh colored punctate papular lesions. She has been seen both in the ED and by her primary ObGyn for this problem on 10/16, started on acyclovir and keflex, and has seen no resolution only worsening of her sx. 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      OB/GYN HISTORY:     (+) hx of fibroids, status post myomectomy  Pt denies any hx of endometriosis, known ovarian cyst, STIs, abnormal pap smears, breast/uterine/ovarian cancer                                          Allergies:  No Known Drug Allergies/Intolerances      PAST MEDICAL & SURGICAL HISTORY:  No pertinent past medical history  H/O myomectomy   LEV GARLAND is a 50y who presents with a chief complaint of painful vulvar lesion. Pt states approximately 3w ago she noticed what she believes was ringworm on her upper abdomen and used OTC cream in an attempt to resolve the infections. It did not resolve, instead she noticed the onset of two additional skin changes, one she describes as similar in origin to the lesion on her chest evolving on her upper thighs/groin/vulvar region that was initially itchy but soon became painful pustules which have now ruptured and are raw and oozing. She has been taking ibuprofen and oxy prn for pain. The second skin change she describes as a rash that she's noticed also on her inner thighs that are small flesh colored papular lesions. She has been seen both in the ED and by her primary ObGyn for this problem on 10/16, started on acyclovir and keflex, and has seen no resolution only worsening of her sx. 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      OB/GYN HISTORY:     (+) hx of fibroids, status post myomectomy  Pt denies any hx of endometriosis, known ovarian cyst, STIs, abnormal pap smears, breast/uterine/ovarian cancer                                          Allergies:  No Known Drug Allergies/Intolerances      PAST MEDICAL & SURGICAL HISTORY:  No pertinent past medical history  H/O myomectomy

## 2020-10-22 NOTE — CONSULT NOTE ADULT - ASSESSMENT
51 yo F who presents with a chief complaint of painful vulvar lesion. Pt states approximately 3w ago she noticed what she believes was ringworm on her upper abdomen and used OTC cream in an attempt to resolve the infections. It did not resolve, instead she noticed the onset of two additional skin changes, one she describes as similar in origin to the lesion on her chest evolving on her upper thighs/groin/vulvar region that was initially itchy but soon became painful pustules which have now ruptured and are raw and oozing. She has been taking ibuprofen and oxy prn for pain. The second skin change she describes as a rash that she's noticed also on her inner thighs that are small flesh colored papular lesions. She has been seen both in the ED and by her primary ObGyn for this problem on 10/16, started on acyclovir and keflex, and has seen no resolution only worsening of her sx. 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     #Vulvar ulcer:  - Dermatology consult, biopsy if needed  - Please obtain HSV/ZVZ PCR swab of the lesion (not serum antibody)  - Please obtain Gonorrhea/Chlmydia NAAT swab of the lesion  - Follow up syphilis serology  - 49 yo F with fibroids s/p myomectomy presents with left sided pubic lesions for 4 days. It started on 10/19 night, getting larger the next day. There is draining pus, foul smelling liquid and freshly pain lesion underneath. She denies hx of STI. She is originally from [facility], came [place] 13 [place]. Last sex was in 11/2019, unprotected sex with a man who is from [facility]. 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 She lives with 2 children (19 yo F and 7 yo M). She has a dog and a guinea pig.     #Vulvar ulcer: the hx of not having sex for almost a year makes the diagnosis less clear in this case  DDx: HSV, VZV, syphilis(less likely given painful lesion), Haemophilus ducreyi (need bacterial culture), lymphogranuloma venereum [LGV] (need GC/Chlamydia NAAT swab), Klebsiella (Calymmatobacterium) granulomatis (diagnosed by biopsy), hidradenitis suppurativa, bacterial superinfection on top of ulcerative lesions, non-infectious, etc  - Dermatology consult, biopsy if needed  - Please obtain HSV/ZVZ PCR swab of the lesion (not serum antibody)  - Please obtain Gonorrhea/Chlmydia NAAT swab of the lesion  - Bacterial, AFB, fungal culture of the purulent fluid on the lesion  - Follow up syphilis serology    d/w Dr. Nigel Gimenez conveyed to primary team

## 2020-10-22 NOTE — CONSULT NOTE ADULT - ATTENDING COMMENTS
50 year old female with fibroids s/p myomectomy presenting with left sided pubic lesions, appears ulcerative, with drainage and very painful. Denies history of STD. Last sexual encounter was in 11/2019. HSV IgG 1 and 2 positive. Afebrile. Leukocytosis improving. HIV negative.    Recommend:  #Left sided vulvar ulcerative lesions  -Possibly HSV, would continue acyclovir and check HSV/ VZV PCR from lesion  -Would r/o other possible causes, though last sexual encounter about 1 year ago  -Check gonorrhea/ chlamydia from ulcers with NAAT swab, check culture for ducreyi.  -Check AFB and fungal swabs as well  -F/U syphilis screen    #Leukocytosis  -Continue to trend to normal.    Nando Manning MD  Pager [phone]  After 5pm/weekends call [phone]
Favor infectious etiologies such as sycosis vulgaris or majocchi's granuloma. Can also consider HSV although morphology would be quite atypical. Other infections such as atypical mycobacterial infection is a possibility as well. Superficial swabs will be a beneficial first step to try and determine the etiology. A punch biopsy for tissue sampling can be performed if bacterial and HSV swabs are initially unrevealing, but the tissue is likely to be too friable to obtain adequate samples for H&E (although we would attempt this as well if we do a biopsy). Empiric therapy is not very clear as the differential is quite broad. Continuing acyclovir for now is appropriate.    Patient seen and exam today at bedside. Chart, labs, vitals, radiology reviewed as applicable. Note reviewed and edited where appropriate.    Agree with history and physical exam. Agree with assessment and plan.

## 2020-10-22 NOTE — PROGRESS NOTE ADULT - PROBLEM SELECTOR PLAN 1
Neuro: c/w PO tylenol/motrin  CV: hemodynamically stable  Pulm: saturating well on room air  FEN: SLIV, regular diet  : voiding freely   - currently working up broad differential with LELO, CRP, ESR, RPR, HIV  - SERUM HSV 10/16 positive HSV 1/2 IgG; rash inconsistent with HSV 2/2 h/o last sexual contact 11/2019; Cx sent from office 10/21 and ED 10/21  Heme: OOB and venodynes in bed  ID: will consult ID for recs; c/w Acyclovir and Bactrim  Skin: will consult Derm and wound care for recs  Dispo: pending recs     seen with Dr. Wise and GYN team  Kira Hammond  [phone]

## 2020-10-23 LAB
ANA TITR SER: NEGATIVE — SIGNIFICANT CHANGE UP
C TRACH RRNA SPEC QL NAA+PROBE: SIGNIFICANT CHANGE UP
N GONORRHOEA RRNA SPEC QL NAA+PROBE: SIGNIFICANT CHANGE UP
SPECIMEN SOURCE: SIGNIFICANT CHANGE UP

## 2020-10-23 PROCEDURE — 99232 SBSQ HOSP IP/OBS MODERATE 35: CPT

## 2020-10-23 RX ORDER — MORPHINE SULFATE 50 MG/1
2 CAPSULE, EXTENDED RELEASE ORAL ONCE
Refills: 0 | Status: DISCONTINUED | OUTPATIENT
Start: 2020-10-23 | End: 2020-10-23

## 2020-10-23 RX ORDER — LIDOCAINE 4 G/100G
1 CREAM TOPICAL
Refills: 0 | Status: DISCONTINUED | OUTPATIENT
Start: 2020-10-23 | End: 2020-10-23

## 2020-10-23 RX ORDER — LIDOCAINE 4 G/100G
1 CREAM TOPICAL
Refills: 0 | Status: DISCONTINUED | OUTPATIENT
Start: 2020-10-23 | End: 2020-10-27

## 2020-10-23 RX ORDER — HEPARIN SODIUM 5000 [USP'U]/ML
5000 INJECTION INTRAVENOUS; SUBCUTANEOUS EVERY 12 HOURS
Refills: 0 | Status: DISCONTINUED | OUTPATIENT
Start: 2020-10-23 | End: 2020-10-27

## 2020-10-23 RX ADMIN — MORPHINE SULFATE 2 MILLIGRAM(S): 50 CAPSULE, EXTENDED RELEASE ORAL at 07:02

## 2020-10-23 RX ADMIN — Medication 600 MILLIGRAM(S): at 03:03

## 2020-10-23 RX ADMIN — Medication 975 MILLIGRAM(S): at 21:36

## 2020-10-23 RX ADMIN — Medication 975 MILLIGRAM(S): at 09:50

## 2020-10-23 RX ADMIN — Medication 975 MILLIGRAM(S): at 08:59

## 2020-10-23 RX ADMIN — Medication 1 APPLICATION(S): at 07:15

## 2020-10-23 RX ADMIN — Medication 975 MILLIGRAM(S): at 03:03

## 2020-10-23 RX ADMIN — MORPHINE SULFATE 2 MILLIGRAM(S): 50 CAPSULE, EXTENDED RELEASE ORAL at 22:04

## 2020-10-23 RX ADMIN — Medication 975 MILLIGRAM(S): at 21:06

## 2020-10-23 RX ADMIN — Medication 600 MILLIGRAM(S): at 02:33

## 2020-10-23 RX ADMIN — Medication 600 MILLIGRAM(S): at 15:20

## 2020-10-23 RX ADMIN — HEPARIN SODIUM 5000 UNIT(S): 5000 INJECTION INTRAVENOUS; SUBCUTANEOUS at 17:42

## 2020-10-23 RX ADMIN — MORPHINE SULFATE 2 MILLIGRAM(S): 50 CAPSULE, EXTENDED RELEASE ORAL at 11:08

## 2020-10-23 RX ADMIN — Medication 400 MILLIGRAM(S): at 21:06

## 2020-10-23 RX ADMIN — Medication 600 MILLIGRAM(S): at 09:50

## 2020-10-23 RX ADMIN — MORPHINE SULFATE 2 MILLIGRAM(S): 50 CAPSULE, EXTENDED RELEASE ORAL at 21:34

## 2020-10-23 RX ADMIN — Medication 975 MILLIGRAM(S): at 02:33

## 2020-10-23 RX ADMIN — MORPHINE SULFATE 2 MILLIGRAM(S): 50 CAPSULE, EXTENDED RELEASE ORAL at 06:32

## 2020-10-23 RX ADMIN — Medication 600 MILLIGRAM(S): at 17:40

## 2020-10-23 RX ADMIN — MORPHINE SULFATE 2 MILLIGRAM(S): 50 CAPSULE, EXTENDED RELEASE ORAL at 11:23

## 2020-10-23 RX ADMIN — Medication 400 MILLIGRAM(S): at 07:15

## 2020-10-23 RX ADMIN — Medication 600 MILLIGRAM(S): at 14:26

## 2020-10-23 RX ADMIN — Medication 600 MILLIGRAM(S): at 23:56

## 2020-10-23 RX ADMIN — Medication 1 TABLET(S): at 17:41

## 2020-10-23 RX ADMIN — Medication 400 MILLIGRAM(S): at 14:26

## 2020-10-23 RX ADMIN — Medication 600 MILLIGRAM(S): at 08:58

## 2020-10-23 RX ADMIN — Medication 1 APPLICATION(S): at 17:41

## 2020-10-23 RX ADMIN — Medication 1 TABLET(S): at 07:15

## 2020-10-23 NOTE — PROGRESS NOTE ADULT - ASSESSMENT
49yo P0 with no significant PMSH a/w worsening pain from vulvar ulcerations of unknown etiology despite Acyclovir and Keflex outpatient. Patient with improved pain control at this time.

## 2020-10-23 NOTE — PROGRESS NOTE ADULT - ASSESSMENT
50 year old female with fibroids s/p myomectomy presenting with left sided pubic lesions, appears ulcerative, with drainage and very painful. Denies history of STD. Last sexual encounter was in 11/2019. HSV IgG 1 and 2 positive. Afebrile. Leukocytosis improving. HIV negative.    Recommend:  #Left sided vulvar ulcerative lesions  -Possibly HSV, would continue acyclovir and F/U HSV/ VZV PCR from lesion  -F/U gonorrhea/ chlamydia from ulcers with NAAT swab, F/U bacterial culture for ducreyi.  -F/U AFB and fungal swabs as well  -Syphilis screen negative    #Leukocytosis  -Continue to trend to normal.    Nando Manning MD  Pager [phone]  After 5pm/weekends call [phone]

## 2020-10-23 NOTE — PROGRESS NOTE ADULT - PROBLEM SELECTOR PLAN 1
Neuro: c/w PO tylenol/motrin, Morphine PRN  CV: hemodynamically stable  Pulm: saturating well on room air  FEN: SLIV, regular diet  : voiding freely    office 10/21 and ED 10/21  Heme: OOB and venodynes in bed  ID: Appreciate ID recs, c/w Acyclovir, Lotrimazole, Bactrim  - RPR NR  - F/U HSV/ZVZ PCR, Gonorrhea/Chlmydia NAAT swabs of lesion  Skin: Appreciate derm recs   Dispo: c/w observation, pending return of cx    Kira Hammond R2

## 2020-10-23 NOTE — PROGRESS NOTE ADULT - SUBJECTIVE AND OBJECTIVE BOX
CC: Patient is a 50y old  Female who presents with a chief complaint of vulvar excoriations (23 Oct 2020 07:12)    ID following for vulvar lesions    Interval History/ROS: Patient remains with pain at the site of vulvar lesions, states worsening. No fevers, no chills    Rest of ROS negative.    Allergies  No Known Allergies    ANTIMICROBIALS:  acyclovir   Oral Tab/Cap 400 three times a day  trimethoprim  160 mG/sulfamethoxazole 800 mG 1 two times a day      OTHER MEDS:  acetaminophen   Tablet .. 975 milliGRAM(s) Oral every 6 hours  clotrimazole 1% Cream 1 Application(s) Topical two times a day  heparin   Injectable 5000 Unit(s) SubCutaneous every 12 hours  ibuprofen  Tablet. 600 milliGRAM(s) Oral every 6 hours  influenza   Vaccine 0.5 milliLiter(s) IntraMuscular once  lidocaine 2% Gel 1 Application(s) Topical four times a day  morphine  - Injectable 2 milliGRAM(s) IV Push every 4 hours PRN      PE:    Vital Signs Last 24 Hrs  T(C): 36.9 (23 Oct 2020 11:03), Max: 36.9 (23 Oct 2020 11:03)  T(F): 98.5 (23 Oct 2020 11:03), Max: 98.5 (23 Oct 2020 11:03)  HR: 64 (23 Oct 2020 11:03) (54 - 64)  BP: 129/80 (23 Oct 2020 11:03) (120/67 - 130/79)  BP(mean): --  RR: 18 (23 Oct 2020 11:03) (14 - 18)  SpO2: 100% (23 Oct 2020 11:03) (98% - 100%)    Gen: AOx3, NAD  CV: S1+S2 normal, no murmurs  Resp: Clear bilat, no resp distress  Abd: Soft, nontender, +BS  Ext: No LE edema, no wounds  : No Bauman, left sided vulvar lesions with drainage  IV/Skin: No thrombophlebitis  Neuro: no focal deficits    LABS:                          12.2   11.08 )-----------( 368      ( 21 Oct 2020 22:00 )             41.1       10-21    144  |  105  |  17  ----------------------------<  111<H>  4.0   |  29  |  1.04    Ca    9.8      21 Oct 2020 22:00    TPro  7.7  /  Alb  4.2  /  TBili  0.3  /  DBili  x   /  AST  22  /  ALT  23  /  AlkPhos  93  10-21    MICROBIOLOGY:  v  .Other Other, vaginal ulcer  10-22-20 --  --  --      .Other vaginal ulcer  10-22-20   Testing in progress  --  --    RADIOLOGY:    No new images.

## 2020-10-23 NOTE — ADVANCED PRACTICE NURSE CONSULT - RECOMMEDATIONS
Recommend imagining to R/O further infection of pubis area.  Recommend follow up care at [facility] ([phone], [address] [place] [address]).     Pubis and groin: Cleanse with NS, pat dry. Apply Liquid barrier film to periwound skin. Apply Aquacel AG hydrofiber, cover with abdominal pad and secure with mesh briefs.     B/L groin: Place Interdry textile sheeting, remove to wash & dry affected area, then replace. Individual sheeting may be used for up to 5 days unless soiled.     Please call wound care service line is further assistance is needed ([phone]). Recommend imagining to R/O further infection of pubis area.  Recommend [facility] ([phone]) Consult or Surgical Consult for lesions with necrotic tissue and signs of soft tissue infection.  Recommend follow up care at [facility] ([phone], [address] [place] [address]).     Pubis and groin: (this dressing is instead of clotrimazole ointment if Dermatology does not think that ointment is necessary) Cleanse with NS, pat dry. Apply Liquid barrier film to periwound skin. Apply Aquacel AG hydrofiber, cover with abdominal pad and secure with mesh briefs.     B/L groin: Place Interdry textile sheeting, remove to wash & dry affected area, then replace. Individual sheeting may be used for up to 5 days unless soiled.     Please call wound care service line is further assistance is needed ([phone]).

## 2020-10-23 NOTE — PROGRESS NOTE ADULT - SUBJECTIVE AND OBJECTIVE BOX
HD#3    Patient seen and examined at bedside, no acute overnight events. No acute complaints, pain well controlled with Morphine PRN, last received 30mins prior to evaluation. Patient is ambulating and tolerating regular diet. Voiding freely. Denies CP, SOB, N/V, fevers, and chills.    Vital Signs Last 24 Hours  T(C): 36.8 (10-23-20 @ 06:29), Max: 36.8 (10-23-20 @ 06:29)  HR: 56 (10-23-20 @ 06:29) (55 - 59)  BP: 123/76 (10-23-20 @ 06:29) (118/60 - 130/79)  RR: 14 (10-23-20 @ 06:29) (14 - 18)  SpO2: 100% (10-23-20 @ 06:29) (98% - 100%)    I&O's Summary    22 Oct 2020 07:01  -  23 Oct 2020 07:00  --------------------------------------------------------  IN: 1660 mL / OUT: 1850 mL / NET: -190 mL    Physical Exam:  General: NAD, resting comfortably in bed  Lungs: nonlabored breathing  : multiple 0.5cm weeping vulvar excoriations/pustules on b/l lateral portions of mons; physiologic appearing vaginal discharge  Ext: No pain or swelling    Labs:                        12.2   11.08 )-----------( 368      ( 21 Oct 2020 22:00 )             41.1   baso 0.6    eos 3.8    imm gran 0.4    lymph 30.7   mono 6.7    poly 57.8       MEDICATIONS  (STANDING):  acetaminophen   Tablet .. 975 milliGRAM(s) Oral every 6 hours  acyclovir   Oral Tab/Cap 400 milliGRAM(s) Oral three times a day  clotrimazole 1% Cream 1 Application(s) Topical two times a day  ibuprofen  Tablet. 600 milliGRAM(s) Oral every 6 hours  influenza   Vaccine 0.5 milliLiter(s) IntraMuscular once  trimethoprim  160 mG/sulfamethoxazole 800 mG 1 Tablet(s) Oral two times a day    MEDICATIONS  (PRN):  morphine  - Injectable 2 milliGRAM(s) IV Push every 4 hours PRN Severe Pain (7 - 10)

## 2020-10-23 NOTE — ADVANCED PRACTICE NURSE CONSULT - ASSESSMENT
A&Ox4, continent of urine and stool. Skin warm, dry with increased moisture in intertriginous folds, adequate skin turgor. Between breast (area that was ringworm) has hyperpigmentation, no active rash.    Pubis area with scattered areas of circular punched out lesions with yellow, moist tissue on wound bases. The entire area of pubis is palpable induration, not able to express drainage from open wounds with palpation of pubis. There is increased in warmth, edema noted, no erythema noted. Bilateral groin with hyperpigmentation and purple hue secondary to exudate from wounds causing moisture dermatitis. Patient reports pain of 10/10 with cleansing of the area and stopped cleansing the area at home due to pain. Last time she cleansed the area was Tuesday at home. There is mild odor, may be from poor hygiene versus wound drainage. Goal of care: protect periwound skin, manage exudate, decrease/control bioburden, recommend imaging to R/O tissue infection. A&Ox4, continent of urine and stool. Skin warm, dry with increased moisture in intertriginous folds, adequate skin turgor. Between breast (area that was ringworm) has hyperpigmentation, no active rash.    Pubis area with scattered areas of circular punched out lesions with yellow, moist tissue on wound bases. The entire area of pubis is palpable induration, not able to express drainage from open wounds with palpation of pubis. There is increased in warmth, edema noted, no erythema noted. Bilateral groin with hyperpigmentation and purple hue secondary to exudate from wounds causing moisture dermatitis. Patient reports pain of 10/10 with cleansing of the area and stopped cleansing the area at home due to pain. Last time she cleansed the area was Tuesday at home. There is mild odor, may be from poor hygiene versus wound drainage. The lesions do not extend into vaginal area/ labia minora and is not affecting urination. Goal of care: protect periwound skin, manage exudate, decrease/control bioburden, recommend imaging to R/O tissue infection.

## 2020-10-23 NOTE — ADVANCED PRACTICE NURSE CONSULT - REASON FOR CONSULT
Patient seen on skin care rounds after wound care referral received for assessment of skin impairment and recommendations of topical management. Chart reviewed: Serum WBC 11.08, Jaquan 21, BMI 36.3kg/m2, ESR 51. Patient H/O fibroids s/p myomectomy who presented to ED for painful ulcerations of the mons pubis. Patient reports having single "ring worm" like rash on central chest 3 weeks ago between breast. She bought OTC lotrimin and treated the area. Shortly after she noticed similar rashes in bilateral inner thighs and used lotrimin in these areas as well. She then noticed the appearance of white pus like bumps on the mons pubis. These began to grow in size. Last wednesday she saw her PCP who started her on cephalexin PO and topical mupirocin. The rash continued to get worse, prompting her to come to the ED this past friday due to increased pain and ulceration. She was started on acyclovir and continued on oral cephalexin and discharged home. The rash continued to develop deeper ulcerations and exquisite pain and she presented to the ED 10/21 for further eval. Seen by dermatology, GYN and ID services.

## 2020-10-24 LAB
ALBUMIN SERPL ELPH-MCNC: 3.5 G/DL — SIGNIFICANT CHANGE UP (ref 3.3–5)
ALP SERPL-CCNC: 79 U/L — SIGNIFICANT CHANGE UP (ref 40–120)
ALT FLD-CCNC: 16 U/L — SIGNIFICANT CHANGE UP (ref 4–33)
ANION GAP SERPL CALC-SCNC: 11 MMO/L — SIGNIFICANT CHANGE UP (ref 7–14)
APTT BLD: 34.9 SEC — SIGNIFICANT CHANGE UP (ref 27–36.3)
AST SERPL-CCNC: 17 U/L — SIGNIFICANT CHANGE UP (ref 4–32)
BASOPHILS # BLD AUTO: 0.04 K/UL — SIGNIFICANT CHANGE UP (ref 0–0.2)
BASOPHILS NFR BLD AUTO: 0.5 % — SIGNIFICANT CHANGE UP (ref 0–2)
BILIRUB SERPL-MCNC: 0.3 MG/DL — SIGNIFICANT CHANGE UP (ref 0.2–1.2)
BUN SERPL-MCNC: 17 MG/DL — SIGNIFICANT CHANGE UP (ref 7–23)
CALCIUM SERPL-MCNC: 9.2 MG/DL — SIGNIFICANT CHANGE UP (ref 8.4–10.5)
CHLORIDE SERPL-SCNC: 103 MMOL/L — SIGNIFICANT CHANGE UP (ref 98–107)
CO2 SERPL-SCNC: 23 MMOL/L — SIGNIFICANT CHANGE UP (ref 22–31)
CREAT SERPL-MCNC: 1.09 MG/DL — SIGNIFICANT CHANGE UP (ref 0.5–1.3)
EOSINOPHIL # BLD AUTO: 0.29 K/UL — SIGNIFICANT CHANGE UP (ref 0–0.5)
EOSINOPHIL NFR BLD AUTO: 3.7 % — SIGNIFICANT CHANGE UP (ref 0–6)
GLUCOSE SERPL-MCNC: 83 MG/DL — SIGNIFICANT CHANGE UP (ref 70–99)
HCT VFR BLD CALC: 37.6 % — SIGNIFICANT CHANGE UP (ref 34.5–45)
HGB BLD-MCNC: 11.4 G/DL — LOW (ref 11.5–15.5)
HSV+VZV DNA SPEC QL NAA+PROBE: SIGNIFICANT CHANGE UP
IMM GRANULOCYTES NFR BLD AUTO: 0.3 % — SIGNIFICANT CHANGE UP (ref 0–1.5)
INR BLD: 1.09 — SIGNIFICANT CHANGE UP (ref 0.88–1.16)
LYMPHOCYTES # BLD AUTO: 2.95 K/UL — SIGNIFICANT CHANGE UP (ref 1–3.3)
LYMPHOCYTES # BLD AUTO: 37.4 % — SIGNIFICANT CHANGE UP (ref 13–44)
MCHC RBC-ENTMCNC: 25 PG — LOW (ref 27–34)
MCHC RBC-ENTMCNC: 30.3 % — LOW (ref 32–36)
MCV RBC AUTO: 82.5 FL — SIGNIFICANT CHANGE UP (ref 80–100)
MONOCYTES # BLD AUTO: 0.55 K/UL — SIGNIFICANT CHANGE UP (ref 0–0.9)
MONOCYTES NFR BLD AUTO: 7 % — SIGNIFICANT CHANGE UP (ref 2–14)
NEUTROPHILS # BLD AUTO: 4.03 K/UL — SIGNIFICANT CHANGE UP (ref 1.8–7.4)
NEUTROPHILS NFR BLD AUTO: 51.1 % — SIGNIFICANT CHANGE UP (ref 43–77)
NRBC # FLD: 0 K/UL — SIGNIFICANT CHANGE UP (ref 0–0)
PLATELET # BLD AUTO: 317 K/UL — SIGNIFICANT CHANGE UP (ref 150–400)
PMV BLD: 10.7 FL — SIGNIFICANT CHANGE UP (ref 7–13)
POTASSIUM SERPL-MCNC: 4 MMOL/L — SIGNIFICANT CHANGE UP (ref 3.5–5.3)
POTASSIUM SERPL-SCNC: 4 MMOL/L — SIGNIFICANT CHANGE UP (ref 3.5–5.3)
PROT SERPL-MCNC: 6.9 G/DL — SIGNIFICANT CHANGE UP (ref 6–8.3)
PROTHROM AB SERPL-ACNC: 12.4 SEC — SIGNIFICANT CHANGE UP (ref 10.6–13.6)
RBC # BLD: 4.56 M/UL — SIGNIFICANT CHANGE UP (ref 3.8–5.2)
RBC # FLD: 12.3 % — SIGNIFICANT CHANGE UP (ref 10.3–14.5)
SODIUM SERPL-SCNC: 137 MMOL/L — SIGNIFICANT CHANGE UP (ref 135–145)
SPECIMEN SOURCE: SIGNIFICANT CHANGE UP
WBC # BLD: 7.88 K/UL — SIGNIFICANT CHANGE UP (ref 3.8–10.5)
WBC # FLD AUTO: 7.88 K/UL — SIGNIFICANT CHANGE UP (ref 3.8–10.5)

## 2020-10-24 PROCEDURE — 99232 SBSQ HOSP IP/OBS MODERATE 35: CPT

## 2020-10-24 PROCEDURE — 72193 CT PELVIS W/DYE: CPT | Mod: 26

## 2020-10-24 RX ADMIN — Medication 975 MILLIGRAM(S): at 03:16

## 2020-10-24 RX ADMIN — Medication 975 MILLIGRAM(S): at 19:12

## 2020-10-24 RX ADMIN — Medication 600 MILLIGRAM(S): at 06:47

## 2020-10-24 RX ADMIN — Medication 975 MILLIGRAM(S): at 03:46

## 2020-10-24 RX ADMIN — Medication 975 MILLIGRAM(S): at 10:19

## 2020-10-24 RX ADMIN — HEPARIN SODIUM 5000 UNIT(S): 5000 INJECTION INTRAVENOUS; SUBCUTANEOUS at 19:01

## 2020-10-24 RX ADMIN — Medication 400 MILLIGRAM(S): at 21:34

## 2020-10-24 RX ADMIN — Medication 975 MILLIGRAM(S): at 21:34

## 2020-10-24 RX ADMIN — Medication 600 MILLIGRAM(S): at 06:17

## 2020-10-24 RX ADMIN — Medication 1 TABLET(S): at 06:16

## 2020-10-24 RX ADMIN — HEPARIN SODIUM 5000 UNIT(S): 5000 INJECTION INTRAVENOUS; SUBCUTANEOUS at 06:22

## 2020-10-24 RX ADMIN — Medication 975 MILLIGRAM(S): at 15:27

## 2020-10-24 RX ADMIN — Medication 600 MILLIGRAM(S): at 19:12

## 2020-10-24 RX ADMIN — Medication 1 APPLICATION(S): at 19:02

## 2020-10-24 RX ADMIN — Medication 1 TABLET(S): at 19:02

## 2020-10-24 RX ADMIN — Medication 600 MILLIGRAM(S): at 00:26

## 2020-10-24 RX ADMIN — Medication 975 MILLIGRAM(S): at 12:02

## 2020-10-24 RX ADMIN — Medication 400 MILLIGRAM(S): at 06:16

## 2020-10-24 RX ADMIN — Medication 600 MILLIGRAM(S): at 13:44

## 2020-10-24 RX ADMIN — Medication 600 MILLIGRAM(S): at 12:16

## 2020-10-24 RX ADMIN — Medication 975 MILLIGRAM(S): at 22:04

## 2020-10-24 RX ADMIN — Medication 600 MILLIGRAM(S): at 19:02

## 2020-10-24 RX ADMIN — Medication 400 MILLIGRAM(S): at 15:18

## 2020-10-24 NOTE — PROGRESS NOTE ADULT - SUBJECTIVE AND OBJECTIVE BOX
HD#4    Patient seen and examined at bedside, no acute overnight events. No acute complaints, pain well controlled with Morphine PRN, last received after dressing change on ulcerations. Patient is ambulating and tolerating regular diet. Voiding freely. Denies CP, SOB, N/V, fevers, and chills.      Vital Signs Last 24 Hours  T(C): 36.8 (10-24-20 @ 09:56), Max: 37.1 (10-23-20 @ 17:03)  HR: 58 (10-24-20 @ 09:56) (54 - 68)  BP: 112/67 (10-24-20 @ 09:56) (108/63 - 125/80)  RR: 17 (10-24-20 @ 09:56) (16 - 18)  SpO2: 100% (10-24-20 @ 09:56) (97% - 100%)    I&O's Summary    23 Oct 2020 07:01  -  24 Oct 2020 07:00  --------------------------------------------------------  IN: 720 mL / OUT: 0 mL / NET: 720 mL    Physical Exam:  General: NAD, resting comfortably in bed  Lungs: nonlabored breathing  : multiple 0.5cm weeping vulvar ulcerations on b/l lateral portions of mons; covered with Aquacel and liquid barrier film  Ext: No pain or swelling    Labs:                        11.4   7.88  )-----------( 317      ( 24 Oct 2020 05:16 )             37.6   baso 0.5    eos 3.7    imm gran 0.3    lymph 37.4   mono 7.0    poly 51.1                         12.2   11.08 )-----------( 368      ( 21 Oct 2020 22:00 )             41.1   baso 0.6    eos 3.8    imm gran 0.4    lymph 30.7   mono 6.7    poly 57.8       MEDICATIONS  (STANDING):  acetaminophen   Tablet .. 975 milliGRAM(s) Oral every 6 hours  acyclovir   Oral Tab/Cap 400 milliGRAM(s) Oral three times a day  clotrimazole 1% Cream 1 Application(s) Topical two times a day  heparin   Injectable 5000 Unit(s) SubCutaneous every 12 hours  ibuprofen  Tablet. 600 milliGRAM(s) Oral every 6 hours  influenza   Vaccine 0.5 milliLiter(s) IntraMuscular once  trimethoprim  160 mG/sulfamethoxazole 800 mG 1 Tablet(s) Oral two times a day    MEDICATIONS  (PRN):  lidocaine 2% Gel 1 Application(s) Topical four times a day PRN changing dressing  morphine  - Injectable 2 milliGRAM(s) IV Push every 4 hours PRN Severe Pain (7 - 10)   GYN PROGRESS NOTE    Delayed note entry; pt evaluated @ 06:30am    HD#4    Patient seen and examined at bedside, no acute overnight events. No acute complaints, pain well controlled with Morphine PRN, last received after dressing change on ulcerations. Patient is ambulating and tolerating regular diet. Voiding freely. Denies CP, SOB, N/V, fevers, and chills.      Vital Signs Last 24 Hours  T(C): 36.8 (10-24-20 @ 09:56), Max: 37.1 (10-23-20 @ 17:03)  HR: 58 (10-24-20 @ 09:56) (54 - 68)  BP: 112/67 (10-24-20 @ 09:56) (108/63 - 125/80)  RR: 17 (10-24-20 @ 09:56) (16 - 18)  SpO2: 100% (10-24-20 @ 09:56) (97% - 100%)    I&O's Summary    23 Oct 2020 07:01  -  24 Oct 2020 07:00  --------------------------------------------------------  IN: 720 mL / OUT: 0 mL / NET: 720 mL    Physical Exam:  General: NAD, resting comfortably in bed  Lungs: nonlabored breathing  : multiple 0.5cm weeping vulvar ulcerations on b/l lateral portions of mons; covered with Aquacel and liquid barrier film  Ext: No pain or swelling    Labs:                        11.4   7.88  )-----------( 317      ( 24 Oct 2020 05:16 )             37.6   baso 0.5    eos 3.7    imm gran 0.3    lymph 37.4   mono 7.0    poly 51.1                         12.2   11.08 )-----------( 368      ( 21 Oct 2020 22:00 )             41.1   baso 0.6    eos 3.8    imm gran 0.4    lymph 30.7   mono 6.7    poly 57.8       MEDICATIONS  (STANDING):  acetaminophen   Tablet .. 975 milliGRAM(s) Oral every 6 hours  acyclovir   Oral Tab/Cap 400 milliGRAM(s) Oral three times a day  clotrimazole 1% Cream 1 Application(s) Topical two times a day  heparin   Injectable 5000 Unit(s) SubCutaneous every 12 hours  ibuprofen  Tablet. 600 milliGRAM(s) Oral every 6 hours  influenza   Vaccine 0.5 milliLiter(s) IntraMuscular once  trimethoprim  160 mG/sulfamethoxazole 800 mG 1 Tablet(s) Oral two times a day    MEDICATIONS  (PRN):  lidocaine 2% Gel 1 Application(s) Topical four times a day PRN changing dressing  morphine  - Injectable 2 milliGRAM(s) IV Push every 4 hours PRN Severe Pain (7 - 10)

## 2020-10-24 NOTE — PROGRESS NOTE ADULT - SUBJECTIVE AND OBJECTIVE BOX
Patient is a 50y old  Female who presents with a chief complaint of vulvar excoriations (23 Oct 2020 07:12)    ID following for vulvar lesions    Interval History/ROS:  pain seems to be better controlled today.  minimal drainage from the ulcer.  Rest of ROS negative.    Allergies  No Known Allergies    ANTIMICROBIALS:    acyclovir   Oral Tab/Cap 400 three times a day  trimethoprim  160 mG/sulfamethoxazole 800 mG 1 two times a day    MEDICATIONS  (STANDING):  acetaminophen   Tablet .. 975 every 6 hours  heparin   Injectable 5000 every 12 hours  ibuprofen  Tablet. 600 every 6 hours  influenza   Vaccine 0.5 once    Vital Signs Last 24 Hrs  T(F): 98.6 (10-24-20 @ 14:09), Max: 98.7 (10-23-20 @ 17:03)  HR: 62 (10-24-20 @ 14:09)  BP: 122/72 (10-24-20 @ 14:09)  RR: 16 (10-24-20 @ 14:09)  SpO2: 98% (10-24-20 @ 14:09) (97% - 100%)    PHYSICAL EXAM:  Constitutional: non-toxic, no distress  HEAD/EYES: anicteric  ENT:  supple  Cardiovascular:   normal S1, S2  Respiratory:  clear BS bilaterally  GI:  soft, non-tender, normal bowel sounds  :  mons are with ulcer and tenderness, minimal drainage on the dressing which has adhered to the ulcer  Musculoskeletal:  no synovitis  Neurologic: awake and alert, normal strength, no focal findings  Skin:  ring worm chest  Psychiatric:  awake, alert, appropriate mood                        11.4   7.88  )-----------( 317      ( 24 Oct 2020 05:16 )             37.6 10-24    137  |  103  |  17  ----------------------------<  83  4.0   |  23  |  1.09  Ca    9.2      24 Oct 2020 05:16  TPro  6.9  /  Alb  3.5  /  TBili  0.3  /  DBili  x   /  AST  17  /  ALT  16  /  AlkPhos  79  10-24    (10.21.20 @ 22:00) HIV Combo Result: NonReact  (10.22.20 @ 13:50) Chlamydia/GC Nucleic Acid Amplification   Chlamydia Amplification Result: Not Detected   GC Amplification Result: Not Detected  (10.22.20 @ 04:33) Syphilis Screen - Treponema Pallidum Antibody Interpretation: Negative:     MICROBIOLOGY:  Culture - Acid Fast - Other w/Smear (collected 10-22-20 @ 20:27)  Source: .Other Other, vaginal ulcer    Culture - Abscess with Gram Stain (collected 10-22-20 @ 20:27)  Source: .Abscess left groin ulcer  Preliminary Report (10-23-20 @ 15:51):    No growth    Culture - Fungal, Other (collected 10-22-20 @ 20:26)  Source: .Other vaginal ulcer  Preliminary Report (10-23-20 @ 08:40):    Testing in progress    RADIOLOGY:  CT Pelvis w/ IV Cont (10.24.20 @ 11:08) >  IMPRESSION:  Mild inflammation is noted involving the vulva. No abscess or subcutaneous gas.

## 2020-10-24 NOTE — PROGRESS NOTE ADULT - ASSESSMENT
51yo P0 with no significant PMSH a/w worsening pain from vulvar ulcerations of unknown etiology despite Acyclovir and Keflex outpatient. Patient with improved pain control at this time.

## 2020-10-24 NOTE — PROGRESS NOTE ADULT - ASSESSMENT
50F with fibroids s/p myomectomy presenting with left sided pubic lesions, appears ulcerative, with drainage and very painful. Denies history of STD. Last sexual encounter was in 11/2019. HSV IgG 1 and 2 positive. Afebrile. Leukocytosis improving. HIV negative.    Recommend:  #Left sided vulvar ulcerative lesions  -Possibly HSV, would continue acyclovir and F/U HSV/ VZV PCR from lesion  -F/U AFB and fungal swabs as well  -possible derm biopsy if all results negative    #Leukocytosis  -Continue to trend to normal.    Please call the ID service [phone] with questions or concerns over the weekend

## 2020-10-24 NOTE — PROGRESS NOTE ADULT - PROBLEM SELECTOR PLAN 1
Neuro: c/w PO tylenol/motrin, Morphine PRN  - if requesting additional pain medications, will trial Gabapentin 300mg PRN  CV: hemodynamically stable  Pulm: saturating well on room air  FEN: SLIV, regular diet  : voiding freely  - CT Pelvis with IV contrast ordered 10/23 to investigate for tracking of ulceration/infxn  Heme: OOB and venodynes in bed  ID: Appreciate ID recs, c/w Acyclovir, Lotrimazole, Bactrim  - RPR NR  - F/U HSV/ZVZ PCR, Gonorrhea/Chlmydia NAAT swabs of lesion (pending)  Skin: Appreciate derm and wound care recs  Dispo: c/w observation, pending return of cx    Kira Hammond R2

## 2020-10-25 LAB
-  AMPICILLIN/SULBACTAM: SIGNIFICANT CHANGE UP
-  CEFAZOLIN: SIGNIFICANT CHANGE UP
-  CLINDAMYCIN: SIGNIFICANT CHANGE UP
-  ERYTHROMYCIN: SIGNIFICANT CHANGE UP
-  GENTAMICIN: SIGNIFICANT CHANGE UP
-  OXACILLIN: SIGNIFICANT CHANGE UP
-  RIFAMPIN: SIGNIFICANT CHANGE UP
-  TETRACYCLINE: SIGNIFICANT CHANGE UP
-  TRIMETHOPRIM/SULFAMETHOXAZOLE: SIGNIFICANT CHANGE UP
-  VANCOMYCIN: SIGNIFICANT CHANGE UP
CULTURE RESULTS: SIGNIFICANT CHANGE UP
METHOD TYPE: SIGNIFICANT CHANGE UP
ORGANISM # SPEC MICROSCOPIC CNT: SIGNIFICANT CHANGE UP
ORGANISM # SPEC MICROSCOPIC CNT: SIGNIFICANT CHANGE UP
SPECIMEN SOURCE: SIGNIFICANT CHANGE UP

## 2020-10-25 RX ORDER — AMPICILLIN SODIUM AND SULBACTAM SODIUM 250; 125 MG/ML; MG/ML
INJECTION, POWDER, FOR SUSPENSION INTRAMUSCULAR; INTRAVENOUS
Refills: 0 | Status: DISCONTINUED | OUTPATIENT
Start: 2020-10-25 | End: 2020-10-25

## 2020-10-25 RX ORDER — AMPICILLIN SODIUM AND SULBACTAM SODIUM 250; 125 MG/ML; MG/ML
1.5 INJECTION, POWDER, FOR SUSPENSION INTRAMUSCULAR; INTRAVENOUS EVERY 6 HOURS
Refills: 0 | Status: DISCONTINUED | OUTPATIENT
Start: 2020-10-25 | End: 2020-10-27

## 2020-10-25 RX ORDER — AMPICILLIN SODIUM AND SULBACTAM SODIUM 250; 125 MG/ML; MG/ML
1.5 INJECTION, POWDER, FOR SUSPENSION INTRAMUSCULAR; INTRAVENOUS EVERY 6 HOURS
Refills: 0 | Status: DISCONTINUED | OUTPATIENT
Start: 2020-10-25 | End: 2020-10-25

## 2020-10-25 RX ORDER — LACTOBACILLUS ACIDOPHILUS 100MM CELL
1 CAPSULE ORAL DAILY
Refills: 0 | Status: DISCONTINUED | OUTPATIENT
Start: 2020-10-25 | End: 2020-10-27

## 2020-10-25 RX ORDER — AMPICILLIN SODIUM AND SULBACTAM SODIUM 250; 125 MG/ML; MG/ML
1.5 INJECTION, POWDER, FOR SUSPENSION INTRAMUSCULAR; INTRAVENOUS ONCE
Refills: 0 | Status: COMPLETED | OUTPATIENT
Start: 2020-10-25 | End: 2020-10-25

## 2020-10-25 RX ADMIN — Medication 600 MILLIGRAM(S): at 00:03

## 2020-10-25 RX ADMIN — MORPHINE SULFATE 2 MILLIGRAM(S): 50 CAPSULE, EXTENDED RELEASE ORAL at 20:55

## 2020-10-25 RX ADMIN — Medication 975 MILLIGRAM(S): at 11:03

## 2020-10-25 RX ADMIN — Medication 975 MILLIGRAM(S): at 20:59

## 2020-10-25 RX ADMIN — Medication 600 MILLIGRAM(S): at 12:00

## 2020-10-25 RX ADMIN — Medication 600 MILLIGRAM(S): at 00:33

## 2020-10-25 RX ADMIN — Medication 1 APPLICATION(S): at 05:01

## 2020-10-25 RX ADMIN — Medication 975 MILLIGRAM(S): at 12:00

## 2020-10-25 RX ADMIN — Medication 1 APPLICATION(S): at 17:59

## 2020-10-25 RX ADMIN — AMPICILLIN SODIUM AND SULBACTAM SODIUM 100 GRAM(S): 250; 125 INJECTION, POWDER, FOR SUSPENSION INTRAMUSCULAR; INTRAVENOUS at 21:46

## 2020-10-25 RX ADMIN — Medication 975 MILLIGRAM(S): at 02:30

## 2020-10-25 RX ADMIN — MORPHINE SULFATE 2 MILLIGRAM(S): 50 CAPSULE, EXTENDED RELEASE ORAL at 20:28

## 2020-10-25 RX ADMIN — Medication 600 MILLIGRAM(S): at 18:00

## 2020-10-25 RX ADMIN — Medication 400 MILLIGRAM(S): at 05:01

## 2020-10-25 RX ADMIN — AMPICILLIN SODIUM AND SULBACTAM SODIUM 100 GRAM(S): 250; 125 INJECTION, POWDER, FOR SUSPENSION INTRAMUSCULAR; INTRAVENOUS at 14:00

## 2020-10-25 RX ADMIN — Medication 600 MILLIGRAM(S): at 17:59

## 2020-10-25 RX ADMIN — Medication 975 MILLIGRAM(S): at 02:00

## 2020-10-25 RX ADMIN — Medication 1 TABLET(S): at 05:01

## 2020-10-25 RX ADMIN — Medication 975 MILLIGRAM(S): at 20:00

## 2020-10-25 RX ADMIN — Medication 600 MILLIGRAM(S): at 11:02

## 2020-10-25 NOTE — PROGRESS NOTE ADULT - ASSESSMENT
51yo P0 with no significant PMSH a/w worsening pain from vulvar ulcerations of unknown etiology despite Acyclovir and Keflex outpatient. Patient with improved pain control at this time.  Neuro: c/w PO tylenol/motrin, Morphine PRN  - if requesting additional pain medications, will trial Gabapentin 300mg PRN  CV: hemodynamically stable  Pulm: saturating well on room air  FEN: SLIV, regular diet  : voiding freely  - CT Pelvis with IV contrast ordered 10/23 to investigate for tracking of ulceration/infxn  Heme: OOB and venodynes in bed  ID: Appreciate ID recs, c/w Acyclovir, Lotrimazole, Bactrim  - RPR NR  - Lesion: HSV ND; NG/CT pending  - Urine NG/CT ND  Skin: poss punch bx today, Appreciate derm and wound care recs  Dispo: c/w observation, pending return of cx    Kira Hammond R2.    51yo P0 with no significant PMSH a/w worsening pain from vulvar ulcerations of unknown etiology despite Acyclovir and Keflex outpatient. Patient with improved pain control at this time.  Neuro: c/w PO tylenol/motrin, Morphine PRN  CV: hemodynamically stable  Pulm: saturating well on room air  FEN: SLIV, regular diet  : voiding freely  - CT Pelvis w/con (10/23): Mild inflammation noted involving the vulva. No abscess or subcutaneous gas.  Heme: OOB and venodynes in bed  ID: Appreciate ID recs, c/w Acyclovir, Lotrimazole, Bactrim  - RPR NR  - Lesion: HSV ND; NG/CT pending  - Urine NG/CT ND  Skin: poss punch bx today, Appreciate derm and wound care recs  Dispo: c/w observation, will bx    Kira Hammond R2.

## 2020-10-25 NOTE — PROGRESS NOTE ADULT - SUBJECTIVE AND OBJECTIVE BOX
Brief ID note    Herpes Simplex Virus 1/2  VZV PCR Result: Not Detected  (10.23.20 @ 02:10)    Culture - Abscess with Gram Stain (10.22.20 @ 20:27)    Specimen Source: .Abscess left groin ulcer    Culture Results:   Rare Staphylococcus lugdunensis  Moderate Finegoldia magna "Susceptibilities not performed"  Few Prevotella bivia "Susceptibilities not performed"  Few Peptostreptococcus anaerobius "Susceptibilities not performed"    < from: CT Pelvis w/ IV Cont (10.24.20 @ 11:08) >  IMPRESSION:  Mild inflammation is noted involving the vulva. No abscess or subcutaneous gas.    < end of copied text >    Given the above finding, would change antibiotics to treat the bacteria on her pelvis mons. Unclear if it's true pathogen though.    Plan:  - DC acyclovir and Bactrim  - Start IV Unasyn 1.5g q6h    Edy Lin MD, PGY4   ID fellow  Pager: [phone]  After 5pm/weekends call [phone]    d/w Dr. Millan  Recs conveyed to primary team ob/gyn

## 2020-10-25 NOTE — PROGRESS NOTE ADULT - SUBJECTIVE AND OBJECTIVE BOX
GYN PROGRESS NOTE    Delayed note entry; pt evaluated @ 06:40am    HD#5    Patient seen and examined at bedside, no acute overnight events. No acute complaints, pain well controlled. Did not use Morphine overnight. Patient is ambulating and tolerating regular diet. Voiding freely. Denies CP, SOB, N/V, fevers, and chills.    Vital Signs Last 24 Hours  T(C): 36.9 (10-25-20 @ 10:18), Max: 37 (10-24-20 @ 14:09)  HR: 57 (10-25-20 @ 10:18) (53 - 69)  BP: 113/63 (10-25-20 @ 10:18) (106/60 - 122/72)  RR: 18 (10-25-20 @ 10:18) (16 - 18)  SpO2: 99% (10-25-20 @ 10:18) (98% - 100%)    I&O's Summary    24 Oct 2020 07:01  -  25 Oct 2020 07:00  --------------------------------------------------------  IN: 0 mL / OUT: 200 mL / NET: -200 mL        Physical Exam:  General: NAD, resting comfortably in bed  Lungs: nonlabored breathing  : multiple 0.5cm weeping vulvar ulcerations on b/l lateral portions of mons; covered with Aquacel and liquid barrier film  Ext: No pain or swelling    Labs:                        11.4   7.88  )-----------( 317      ( 24 Oct 2020 05:16 )             37.6   baso 0.5    eos 3.7    imm gran 0.3    lymph 37.4   mono 7.0    poly 51.1       MEDICATIONS  (STANDING):  acetaminophen   Tablet .. 975 milliGRAM(s) Oral every 6 hours  acyclovir   Oral Tab/Cap 400 milliGRAM(s) Oral three times a day  clotrimazole 1% Cream 1 Application(s) Topical two times a day  heparin   Injectable 5000 Unit(s) SubCutaneous every 12 hours  ibuprofen  Tablet. 600 milliGRAM(s) Oral every 6 hours  influenza   Vaccine 0.5 milliLiter(s) IntraMuscular once  trimethoprim  160 mG/sulfamethoxazole 800 mG 1 Tablet(s) Oral two times a day    MEDICATIONS  (PRN):  lidocaine 2% Gel 1 Application(s) Topical four times a day PRN changing dressing  morphine  - Injectable 2 milliGRAM(s) IV Push every 4 hours PRN Severe Pain (7 - 10)   GYN PROGRESS NOTE    Delayed note entry; pt evaluated @ 06:40am    HD#5    Patient seen and examined at bedside, no acute overnight events. No acute complaints, pain well controlled. Did not use Morphine overnight. Patient is ambulating and tolerating regular diet. Voiding freely. Denies CP, SOB, N/V, fevers, and chills. She has been taking pictures of the ulcerations and has not noted an improvement. However, reports overall improvement in pain control.     Vital Signs Last 24 Hours  T(C): 36.9 (10-25-20 @ 10:18), Max: 37 (10-24-20 @ 14:09)  HR: 57 (10-25-20 @ 10:18) (53 - 69)  BP: 113/63 (10-25-20 @ 10:18) (106/60 - 122/72)  RR: 18 (10-25-20 @ 10:18) (16 - 18)  SpO2: 99% (10-25-20 @ 10:18) (98% - 100%)    I&O's Summary    24 Oct 2020 07:01  -  25 Oct 2020 07:00  --------------------------------------------------------  IN: 0 mL / OUT: 200 mL / NET: -200 mL        Physical Exam:  General: NAD, resting comfortably in bed  Lungs: nonlabored breathing  : multiple 0.5cm weeping vulvar ulcerations on b/l lateral portions of mons; covered with Aquacel and liquid barrier film  Ext: No pain or swelling    Labs:                        11.4   7.88  )-----------( 317      ( 24 Oct 2020 05:16 )             37.6   baso 0.5    eos 3.7    imm gran 0.3    lymph 37.4   mono 7.0    poly 51.1       MEDICATIONS  (STANDING):  acetaminophen   Tablet .. 975 milliGRAM(s) Oral every 6 hours  acyclovir   Oral Tab/Cap 400 milliGRAM(s) Oral three times a day  clotrimazole 1% Cream 1 Application(s) Topical two times a day  heparin   Injectable 5000 Unit(s) SubCutaneous every 12 hours  ibuprofen  Tablet. 600 milliGRAM(s) Oral every 6 hours  influenza   Vaccine 0.5 milliLiter(s) IntraMuscular once  trimethoprim  160 mG/sulfamethoxazole 800 mG 1 Tablet(s) Oral two times a day    MEDICATIONS  (PRN):  lidocaine 2% Gel 1 Application(s) Topical four times a day PRN changing dressing  morphine  - Injectable 2 milliGRAM(s) IV Push every 4 hours PRN Severe Pain (7 - 10)

## 2020-10-26 ENCOUNTER — RESULT REVIEW (OUTPATIENT)
Age: 50
End: 2020-10-26

## 2020-10-26 DIAGNOSIS — D48.5 NEOPLASM OF UNCERTAIN BEHAVIOR OF SKIN: ICD-10-CM

## 2020-10-26 LAB
GRAM STN FLD: SIGNIFICANT CHANGE UP
NIGHT BLUE STAIN TISS: SIGNIFICANT CHANGE UP
SPECIMEN SOURCE: SIGNIFICANT CHANGE UP
SPECIMEN SOURCE: SIGNIFICANT CHANGE UP

## 2020-10-26 PROCEDURE — 99223 1ST HOSP IP/OBS HIGH 75: CPT

## 2020-10-26 PROCEDURE — 11105 PUNCH BX SKIN EA SEP/ADDL: CPT | Mod: GC

## 2020-10-26 PROCEDURE — 88305 TISSUE EXAM BY PATHOLOGIST: CPT | Mod: 26

## 2020-10-26 PROCEDURE — 11104 PUNCH BX SKIN SINGLE LESION: CPT | Mod: GC

## 2020-10-26 PROCEDURE — 88312 SPECIAL STAINS GROUP 1: CPT | Mod: 26

## 2020-10-26 PROCEDURE — 99233 SBSQ HOSP IP/OBS HIGH 50: CPT | Mod: GC,25

## 2020-10-26 RX ORDER — LIDOCAINE HCL 20 MG/ML
10 VIAL (ML) INJECTION ONCE
Refills: 0 | Status: DISCONTINUED | OUTPATIENT
Start: 2020-10-26 | End: 2020-10-27

## 2020-10-26 RX ADMIN — Medication 975 MILLIGRAM(S): at 09:24

## 2020-10-26 RX ADMIN — Medication 600 MILLIGRAM(S): at 11:36

## 2020-10-26 RX ADMIN — AMPICILLIN SODIUM AND SULBACTAM SODIUM 100 GRAM(S): 250; 125 INJECTION, POWDER, FOR SUSPENSION INTRAMUSCULAR; INTRAVENOUS at 21:45

## 2020-10-26 RX ADMIN — Medication 1 APPLICATION(S): at 17:12

## 2020-10-26 RX ADMIN — Medication 600 MILLIGRAM(S): at 17:12

## 2020-10-26 RX ADMIN — Medication 600 MILLIGRAM(S): at 11:37

## 2020-10-26 RX ADMIN — Medication 975 MILLIGRAM(S): at 15:37

## 2020-10-26 RX ADMIN — Medication 600 MILLIGRAM(S): at 23:25

## 2020-10-26 RX ADMIN — MORPHINE SULFATE 2 MILLIGRAM(S): 50 CAPSULE, EXTENDED RELEASE ORAL at 14:08

## 2020-10-26 RX ADMIN — Medication 975 MILLIGRAM(S): at 05:07

## 2020-10-26 RX ADMIN — AMPICILLIN SODIUM AND SULBACTAM SODIUM 100 GRAM(S): 250; 125 INJECTION, POWDER, FOR SUSPENSION INTRAMUSCULAR; INTRAVENOUS at 15:37

## 2020-10-26 RX ADMIN — Medication 600 MILLIGRAM(S): at 06:07

## 2020-10-26 RX ADMIN — Medication 975 MILLIGRAM(S): at 22:39

## 2020-10-26 RX ADMIN — Medication 975 MILLIGRAM(S): at 06:07

## 2020-10-26 RX ADMIN — Medication 600 MILLIGRAM(S): at 05:07

## 2020-10-26 RX ADMIN — AMPICILLIN SODIUM AND SULBACTAM SODIUM 100 GRAM(S): 250; 125 INJECTION, POWDER, FOR SUSPENSION INTRAMUSCULAR; INTRAVENOUS at 05:07

## 2020-10-26 RX ADMIN — Medication 1 TABLET(S): at 11:37

## 2020-10-26 RX ADMIN — Medication 975 MILLIGRAM(S): at 21:45

## 2020-10-26 RX ADMIN — Medication 600 MILLIGRAM(S): at 01:13

## 2020-10-26 RX ADMIN — AMPICILLIN SODIUM AND SULBACTAM SODIUM 100 GRAM(S): 250; 125 INJECTION, POWDER, FOR SUSPENSION INTRAMUSCULAR; INTRAVENOUS at 09:23

## 2020-10-26 RX ADMIN — Medication 975 MILLIGRAM(S): at 09:23

## 2020-10-26 RX ADMIN — Medication 1 APPLICATION(S): at 05:07

## 2020-10-26 RX ADMIN — Medication 600 MILLIGRAM(S): at 17:13

## 2020-10-26 RX ADMIN — Medication 975 MILLIGRAM(S): at 16:37

## 2020-10-26 RX ADMIN — MORPHINE SULFATE 2 MILLIGRAM(S): 50 CAPSULE, EXTENDED RELEASE ORAL at 12:41

## 2020-10-26 RX ADMIN — Medication 600 MILLIGRAM(S): at 00:13

## 2020-10-26 NOTE — CHART NOTE - NSCHARTNOTEFT_GEN_A_CORE
GYN Attending    Pt seen at bedside and vulvar biopsies performed for tissue culture and H&E by myself, Dr. Sousa of dermatology and Dr. Chung.  Dressing had been previously removed by nursing.  Informed consent was obtained.  Brief timeout performed.  Skin prepped with lidocaine jelly and betadine.  1% Lidocaine injected into planned biopsy site.  4mm punch biopsy performed x2 at superior edge of mons pubis lesion left of midline.  Tissue sent for culture and pathology.  Hemostasis obtained with 2-0 chromic interrupted suture.  Pt tolerated procedure well.  No complications, minimal blood loss.    MD Sharon

## 2020-10-26 NOTE — PROGRESS NOTE ADULT - SUBJECTIVE AND OBJECTIVE BOX
HPI:  51 yo F with hx of fibroids s/p myomectomy who presented to ED for painful ulcerations of the mons pubis. Patient reports having single "ring worm" like rash on central chest 3 weeks ago between breast. She bought OTC lotrimin and treated the area. Shortly after she noticed similar rashes in bilateral inner thighs and used lotrimin in these areas as well. She then noticed the appearance of white pus like bumps on the mons pubis. These began to grow in size. Last wednesday she saw her PCP who started her on cephalexin PO and topical mupirocin. The rash continued to get worse, prompting her to come to the ED this past friday due to increased pain and ulceration. She was started on acyclovir and continued on oral cephalexin and discharged home. The rash continued to develop deeper ulcerations and exquisite pain and she presented to the ED 10/21 for further eval.    Derm consulted for these ulcerations. Per patient, denies any history of STDs. She has not been sexually active since 11/2019 in which she had unprotected sex with man from [facility]. No fevers, chills. No pain with urination.     Per chart review, patient lives at home with 2 children. Has pet dog and guinea pig.     INTERVAL HPI/OVERNIGHT EVENTS:  - Feeling much better  - HSV neg, RPR neg  - Prelim cxs- Culture yields >4 types of aerobic and/or anaerobic bacteria:    Culture includes  Rare Staphylococcus lugdunensis  Moderate Finegoldia magna "Susceptibilities not performed"  Few Prevotella bivia "Susceptibilities not performed"  Few Peptostreptococcus anaerobius "Susceptibilities not performed"    - Biopsy x 2 performed today- H+E and tissue culture with OBGYN team assistance      MEDICATIONS  (STANDING):  acetaminophen   Tablet .. 975 milliGRAM(s) Oral every 6 hours  ampicillin/sulbactam  IVPB 1.5 Gram(s) IV Intermittent every 6 hours  clotrimazole 1% Cream 1 Application(s) Topical two times a day  heparin   Injectable 5000 Unit(s) SubCutaneous every 12 hours  ibuprofen  Tablet. 600 milliGRAM(s) Oral every 6 hours  influenza   Vaccine 0.5 milliLiter(s) IntraMuscular once  lactobacillus acidophilus 1 Tablet(s) Oral daily  lidocaine 1% Injectable 10 milliLiter(s) Local Injection once    MEDICATIONS  (PRN):  lidocaine 2% Gel 1 Application(s) Topical four times a day PRN changing dressing  morphine  - Injectable 2 milliGRAM(s) IV Push every 4 hours PRN Severe Pain (7 - 10)      Allergies    No Known Allergies    Intolerances        REVIEW OF SYSTEMS      General: no fevers/chills, no NS	    Skin: see HPI  	  Ophthalmologic: no eye pain or change in vision    Genitourinary: no dysuria or hematuria    Musculoskeletal: no joint pains or weakness	    Neurological: No weakness or tingling          Vital Signs Last 24 Hrs  T(C): 36.6 (26 Oct 2020 12:45), Max: 37.1 (25 Oct 2020 22:01)  T(F): 97.8 (26 Oct 2020 12:45), Max: 98.7 (25 Oct 2020 22:01)  HR: 59 (26 Oct 2020 12:45) (51 - 64)  BP: 115/68 (26 Oct 2020 12:45) (97/41 - 123/53)  BP(mean): --  RR: 16 (26 Oct 2020 12:45) (16 - 22)  SpO2: 100% (26 Oct 2020 12:45) (97% - 100%)    PHYSICAL EXAM:   The patient was alert and oriented X 3, well nourished, and in no  apparent distress.  There was no visible lymphadenopathy.  Conjunctiva were non injected  There was no clubbing or edema of extremities.    Of note on skin exam:   Large pink plaque on mons pubis studded with pustules and with numerous overlying punched out erosions and ulcerations. Extremely painful to palpation. No notable inguinal lymphadenopathy. Greatly improved today    LABS:             HPI:  49 yo F with hx of fibroids s/p myomectomy who presented to ED for painful ulcerations of the mons pubis. Patient reports having single "ring worm" like rash on central chest 3 weeks ago between breast. She bought OTC lotrimin and treated the area. Shortly after she noticed similar rashes in bilateral inner thighs and used lotrimin in these areas as well. She then noticed the appearance of white pus like bumps on the mons pubis. These began to grow in size. Last wednesday she saw her PCP who started her on cephalexin PO and topical mupirocin. The rash continued to get worse, prompting her to come to the ED this past friday due to increased pain and ulceration. She was started on acyclovir and continued on oral cephalexin and discharged home. The rash continued to develop deeper ulcerations and exquisite pain and she presented to the ED 10/21 for further eval.    Derm consulted for these ulcerations. Per patient, denies any history of STDs. She has not been sexually active since 11/2019 in which she had unprotected sex with man from [facility]. No fevers, chills. No pain with urination.     Per chart review, patient lives at home with 2 children. Has pet dog and guinea pig.     INTERVAL HPI/OVERNIGHT EVENTS:  - Feeling much better  - HSV neg, RPR neg  - Prelim cxs- Culture yields >4 types of aerobic and/or anaerobic bacteria:    Culture includes  Rare Staphylococcus lugdunensis  Moderate Finegoldia magna "Susceptibilities not performed"  Few Prevotella bivia "Susceptibilities not performed"  Few Peptostreptococcus anaerobius "Susceptibilities not performed"    - Biopsy x 2 performed today- H+E and tissue culture with OBGYN team assistance      MEDICATIONS  (STANDING):  acetaminophen   Tablet .. 975 milliGRAM(s) Oral every 6 hours  ampicillin/sulbactam  IVPB 1.5 Gram(s) IV Intermittent every 6 hours  clotrimazole 1% Cream 1 Application(s) Topical two times a day  heparin   Injectable 5000 Unit(s) SubCutaneous every 12 hours  ibuprofen  Tablet. 600 milliGRAM(s) Oral every 6 hours  influenza   Vaccine 0.5 milliLiter(s) IntraMuscular once  lactobacillus acidophilus 1 Tablet(s) Oral daily  lidocaine 1% Injectable 10 milliLiter(s) Local Injection once    MEDICATIONS  (PRN):  lidocaine 2% Gel 1 Application(s) Topical four times a day PRN changing dressing  morphine  - Injectable 2 milliGRAM(s) IV Push every 4 hours PRN Severe Pain (7 - 10)      Allergies    No Known Allergies    Intolerances        REVIEW OF SYSTEMS      General: no fevers/chills, no NS	    Skin: see HPI  	  Ophthalmologic: no eye pain or change in vision    Genitourinary: no dysuria or hematuria    Musculoskeletal: no joint pains or weakness	    Neurological: No weakness or tingling          Vital Signs Last 24 Hrs  T(C): 36.6 (26 Oct 2020 12:45), Max: 37.1 (25 Oct 2020 22:01)  T(F): 97.8 (26 Oct 2020 12:45), Max: 98.7 (25 Oct 2020 22:01)  HR: 59 (26 Oct 2020 12:45) (51 - 64)  BP: 115/68 (26 Oct 2020 12:45) (97/41 - 123/53)  BP(mean): --  RR: 16 (26 Oct 2020 12:45) (16 - 22)  SpO2: 100% (26 Oct 2020 12:45) (97% - 100%)    PHYSICAL EXAM:   The patient was alert and oriented X 3, well nourished, and in no  apparent distress.  There was no visible lymphadenopathy.  Conjunctiva were non injected  There was no clubbing or edema of extremities.    Of note on skin exam:   Large pink plaque on mons pubis studded with pustules and with numerous overlying punched out erosions and ulcerations. Extremely painful to palpation. No notable inguinal lymphadenopathy. Less overall induration and pus formation today    LABS: no recent labs

## 2020-10-26 NOTE — CONSULT NOTE ADULT - SUBJECTIVE AND OBJECTIVE BOX
CHIEF COMPLAINT: Patient is a 50y old  Female who presents with a chief complaint of vulvar excoriations (26 Oct 2020 07:00)      HPI: HPI:  RICHARD GARLAND is a 50y who presents with a chief complaint of painful vulvar lesion. Pt states approximately 3w ago she noticed what she believes was ringworm on her upper abdomen and used OTC cream in an attempt to resolve the infections. It did not resolve, instead she noticed the onset of two additional skin changes, one she describes as similar in origin to the lesion on her chest evolving on her upper thighs/groin/vulvar region that was initially itchy but soon became painful pustules which have now ruptured and are raw and oozing. She has been taking ibuprofen and oxy prn for pain. The second skin change she describes as a rash that she's noticed also on her inner thighs that are small flesh colored papular lesions. She has been seen both in the ED and by her primary ObGyn for this problem on 10/16, started on acyclovir and keflex, and has seen no resolution only worsening of her sx. 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        Richard Garland is a 50 year old lady s/p myomectomy who p/w painful vulvular lesions w/foul smelling drainage. Initially it seems like it may have been a ringworm? which may have a superimposed infection secondary to scratching? At this time the patient denies any fevers, chills, shortness of breath, nausea or vomiting.  She also denies history of HTN/    OB/GYN HISTORY:     (+) hx of fibroids, status post myomectomy  Pt denies any hx of endometriosis, known ovarian cyst, STIs, abnormal pap smears, breast/uterine/ovarian cancer                                          Allergies:  No Known Drug Allergies/Intolerances      PAST MEDICAL & SURGICAL HISTORY:  No pertinent past medical history  H/O myomectomy   (22 Oct 2020 00:36)      Pain Symptoms if applicable:             	                           none	    mild         moderate         severe  	                            0	     1-3	      4-6	          7-10  Pain:  Location:	  Modifying factors:	  Associated symptoms:	    Allergies    No Known Allergies    Intolerances        HOME MEDICATIONS: [x] Reviewed    MEDICATIONS  (STANDING):  acetaminophen   Tablet .. 975 milliGRAM(s) Oral every 6 hours  ampicillin/sulbactam  IVPB 1.5 Gram(s) IV Intermittent every 6 hours  clotrimazole 1% Cream 1 Application(s) Topical two times a day  heparin   Injectable 5000 Unit(s) SubCutaneous every 12 hours  ibuprofen  Tablet. 600 milliGRAM(s) Oral every 6 hours  influenza   Vaccine 0.5 milliLiter(s) IntraMuscular once  lactobacillus acidophilus 1 Tablet(s) Oral daily  lidocaine 1% Injectable 10 milliLiter(s) Local Injection once    MEDICATIONS  (PRN):  lidocaine 2% Gel 1 Application(s) Topical four times a day PRN changing dressing  morphine  - Injectable 2 milliGRAM(s) IV Push every 4 hours PRN Severe Pain (7 - 10)      PAST MEDICAL & SURGICAL HISTORY:  No pertinent past medical history    H/O myomectomy    [ ] Reviewed     SOCIAL HISTORY:  [x] Substance abuse:   [x] Tobacco:   [x] Alcohol use:     FAMILY HISTORY:  [x] No pertinent family history in first degree relatives     REVIEW OF SYSTEMS:    [x] All other ROS negative  [  ] Unable to obtain due to poor mental status    Vital Signs Last 24 Hrs  T(C): 36.6 (26 Oct 2020 09:35), Max: 37.1 (25 Oct 2020 22:01)  T(F): 97.8 (26 Oct 2020 09:35), Max: 98.7 (25 Oct 2020 22:01)  HR: 51 (26 Oct 2020 09:35) (51 - 68)  BP: 109/68 (26 Oct 2020 09:35) (97/41 - 124/68)  BP(mean): --  RR: 18 (26 Oct 2020 09:35) (18 - 22)  SpO2: 98% (26 Oct 2020 09:35) (97% - 100%)    PHYSICAL EXAM:    GENERAL: NAD, well-groomed, well-developed  HEAD:  Atraumatic, Normocephalic  EYES: EOMI, PERRLA, conjunctiva and sclera clear  ENMT: Moist mucous membranes  NECK: Supple, No JVD  RESPIRATORY: Clear to auscultation bilaterally; No rales, rhonchi, wheezing, or rubs  CARDIOVASCULAR: Regular rate and rhythm; No murmurs, rubs, or gallops  GASTROINTESTINAL: Soft, Nontender, Nondistended; Bowel sounds present  GENITOURINARY: Not examined  EXTREMITIES:  2+ Peripheral Pulses, No clubbing, cyanosis, or edema  NERVOUS SYSTEM:  Alert & Oriented X3; Moving all 4 extremities; No gross sensory deficits  HEME/LYMPH: No lymphadenopathy noted  SKIN: No rashes or lesions;    LABS:              CAPILLARY BLOOD GLUCOSE          RADIOLOGY & ADDITIONAL STUDIES:    EKG:   Personally Reviewed:  [x] YES     Imaging:   Personally Reviewed:  [x] YES               [ ] Consultant(s) Notes Reviewed  [x] Care Discussed with Consultants/Other Providers: Gyn Team

## 2020-10-26 NOTE — PROGRESS NOTE ADULT - ASSESSMENT
51yo P0 with no significant PMSH a/w worsening pain from vulvar ulcerations of unknown etiology despite Acyclovir and Keflex outpatient, w/ gram stain polymicrobial, in stable condition.     Neuro: c/w PO tylenol/motrin, Morphine PRN  CV: hemodynamically stable  Pulm: saturating well on room air  FEN: SLIV, regular diet  : voiding freely  - CT Pelvis w/con (10/23): Mild inflammation noted involving the vulva. No abscess or subcutaneous gas.  Heme: OOB and venodynes in bed  ID: Appreciate ID recs, now s/p Acyclovir and Bactrim. Continues on Unasyn 10/25.   - RPR NR  - Lesion: HSV ND; GC/CT pending  - Urine NG/CT ND  Skin: Appreciate derm and wound care recommendations. Will discuss punch biopsy.   Dispo: Continues on Unasyn, will discuss biopsy today. Appreciate ID, derm and wound care.     NEREIDA Childs PGY4    49yo P0 with no significant PMSH a/w worsening pain from vulvar ulcerations of unknown etiology despite Acyclovir and Keflex outpatient, w/ gram stain w/ staph luavunensis, in stable condition.     Neuro: c/w PO tylenol/motrin, Morphine PRN  CV: hemodynamically stable  Pulm: saturating well on room air  FEN: SLIV, regular diet  : voiding freely  - CT Pelvis w/con (10/23): Mild inflammation noted involving the vulva. No abscess or subcutaneous gas.  Heme: OOB and venodynes in bed  ID: Appreciate ID recs, now s/p Acyclovir and Bactrim. Gram stain w/ staph sinaunensis, started on Unasyn 10/25.   - RPR NR  - Lesion: HSV ND; GC/CT pending  - Urine NG/CT ND  Skin: Appreciate derm and wound care recommendations. Will discuss punch biopsy.   Dispo: Continues on Unasyn, will discuss biopsy today. Appreciate ID, derm and wound care.     NEREIDA Childs PGY4

## 2020-10-26 NOTE — PROGRESS NOTE ADULT - ASSESSMENT
#Ulcerations of mons pubis  Favor infectious etiologies > inflammatory etiologies  Lesional HSV 1/2 VZV PCR negative- Not likely HSV. Unlikely to need continued acyclovir  Prelim cultures with multiple organisms including staphylococcus lugdunensis. Although this is a common organism as part the normal skin deon, there is multiple reports in the literature of this organism causing skin and soft tissue infections and being an aggressive pathogen similar to staph aureus. Would discuss with ID the need for treatment.  Differential includes bacterial infection (such as sycosis, or a deep hair follicle bacterial infection) vs. fungal infection (such as majocchi's granuloma)  Can consider atypical STIs or infections such as atypical mycobacterial infection     Biopsy performed at bedside by dermatology/OBGYN team- 4mm for H+E and 4 mm for tissue culture- bacterial, fungal, afb. See below    Dermatology Punch Biopsy Procedure Note    After risks and benefits of procedure including bleeding, infection and scar were reviewed (consents including photo consent reviewed, signed and in chart), allergies were reviewed and time out performed. Written consent given by the patient.    Area cleaned with rubbing alcohol and anesthetized with lidocaine and epinephrine.  A 4mm punch biopsy x 2 was performed to mons pubis, hemostasis achieved with 4-0 chromic gut with pressure dressing placed.   Wound care reviewed with patient and team. Biopsy site should remain covered with pressure bandage for 24-48 hours. Then apply Vaseline to biopsy site daily and cover with bandage until healed.  Chromic gut is absorbable suture and does not need to be removed. Will self dissolve    If biopsy or culture show infectious organism, likely will point to answer and guide treatment. At this point, difficult to know the cause/causative organism    Dermatology will continue to chart check and monitor patient for progression    Pilo Sousa MD  Resident Physician, PGY3  [facility] Dermatology  Pager: [phone]  Office: [phone]    The patient's chart was reviewed in addition to being seen and examined at bedside with the dermatology attending Dr. De La Garza  Recommendations were communicated with the primary team.  Please page [phone] for further related questions. #Ulcerations of mons pubis  Favor infectious etiologies > inflammatory etiologies  Lesional HSV 1/2 VZV PCR negative- Not likely HSV. Unlikely to need continued acyclovir  Prelim cultures with multiple organisms including staphylococcus lugdunensis. Although this is a common organism as part the normal skin deon, there is multiple reports in the literature of this organism causing skin and soft tissue infections and being an aggressive pathogen similar to staph aureus  Differential includes bacterial infection (such as sycosis, or a deep hair follicle bacterial infection) vs. fungal infection (such as majocchi's granuloma)  Can consider atypical STIs or infections such as atypical mycobacterial infection     Biopsy performed at bedside by dermatology/OBGYN team- 4mm for H+E and 4 mm for tissue culture- bacterial, fungal, afb. See below    Dermatology Punch Biopsy Procedure Note    After risks and benefits of procedure including bleeding, infection and scar were reviewed (consents including photo consent reviewed, signed and in chart), allergies were reviewed and time out performed. Written consent given by the patient.    Area cleaned with rubbing alcohol and anesthetized with lidocaine and epinephrine.  A 4mm punch biopsy x 2 was performed to mons pubis, hemostasis achieved with 4-0 chromic gut with pressure dressing placed.   Wound care reviewed with patient and team. Biopsy site should remain covered with pressure bandage for 24-48 hours. Then apply Vaseline to biopsy site daily and cover with bandage until healed.  Chromic gut is absorbable suture and does not need to be removed. Will self dissolve    If biopsy or culture show infectious organism, likely will point to answer and guide treatment. At this point, difficult to know the cause/causative organism    Dermatology will continue to chart check and monitor patient for progression    Pilo Sousa MD  Resident Physician, PGY3  [facility] Dermatology  Pager: [phone]  Office: [phone]    The patient's chart was reviewed in addition to being seen and examined at bedside with the dermatology attending Dr. De La Garza  Recommendations were communicated with the primary team.  Please page [phone] for further related questions.

## 2020-10-26 NOTE — PROGRESS NOTE ADULT - SUBJECTIVE AND OBJECTIVE BOX
R4 GYN Progress Note  HD#6     Patient seen and examined at bedside, no acute overnight events. No acute complaints, pain well controlled without morphine overnight. Patient is ambulating, tolerating regular diet and voiding freely.     Denies CP, SOB, N/V, fevers, and chills. She continues to take daily pictures of the ulcerations and has not noted an improvement.    Vital Signs Last 24 Hours  T(C): 36.7 (10-26-20 @ 05:19), Max: 37.1 (10-25-20 @ 22:01)  HR: 60 (10-26-20 @ 05:19) (57 - 68)  BP: 97/41 (10-26-20 @ 05:19) (97/41 - 124/68)  RR: 18 (10-26-20 @ 05:19) (18 - 22)  SpO2: 98% (10-26-20 @ 05:19) (97% - 100%)    I&O's Summary    25 Oct 2020 07:01  -  26 Oct 2020 07:00  --------------------------------------------------------  IN: 100 mL / OUT: 1300 mL / NET: -1200 mL      Physical Exam:  General: NAD, resting comfortably in bed  Lungs: nonlabored breathing  : multiple 0.5cm weeping vulvar ulcerations on b/l lateral portions of mons; covered with Aquacel and liquid barrier film  Ext: No pain or swelling    Labs:             11.4<L>  7.88  )-----------( 317      ( 10-24 @ 05:16 )             37.6                12.2   11.08<H> )-----------( 368      ( 10-21 @ 22:00 )             41.1         MEDICATIONS  (STANDING):  acetaminophen   Tablet .. 975 milliGRAM(s) Oral every 6 hours  ampicillin/sulbactam  IVPB 1.5 Gram(s) IV Intermittent every 6 hours  clotrimazole 1% Cream 1 Application(s) Topical two times a day  heparin   Injectable 5000 Unit(s) SubCutaneous every 12 hours  ibuprofen  Tablet. 600 milliGRAM(s) Oral every 6 hours  influenza   Vaccine 0.5 milliLiter(s) IntraMuscular once  lactobacillus acidophilus 1 Tablet(s) Oral daily    MEDICATIONS  (PRN):  lidocaine 2% Gel 1 Application(s) Topical four times a day PRN changing dressing  morphine  - Injectable 2 milliGRAM(s) IV Push every 4 hours PRN Severe Pain (7 - 10)

## 2020-10-26 NOTE — CONSULT NOTE ADULT - PROBLEM SELECTOR RECOMMENDATION 9
Initially could've been fungal? which may have a superimposed infection secondary to scratching?  polymicrobial, negative for HSV- ID following, antibiotics changed to unasyn yesterday

## 2020-10-26 NOTE — PROGRESS NOTE ADULT - ATTENDING COMMENTS
Attending Note   Pt reports she feels like lesions are "getting worse.' Morphine is ok for pain control as per pt   no problems urinating   plan   awaiting for culture results   appreciate derm and ID consults   continue current acyclovir, lotrimazole, and bactrim   can consider gabepentin for additional pain control     GALILEO Ortiz MD
Overall improving. Differential continues to be sycosis vulgaris (deep hair follicle bacterial infection more commonly in the beard area in men) vs. majocchi's granuloma. Improvement without antifungal therapy would more likely point to a bacterial etiology. S. lungdenesis can be a pathogenic agent in skin/soft tissue infections so this should not be ruled out. Will f/u on tissue cultures and H&E which may help guide whether additional therapy is warranted.     Patient seen and exam today at bedside. Chart, labs, vitals, radiology reviewed as applicable. Note reviewed and edited where appropriate.    Agree with history and physical exam. Agree with assessment and plan.

## 2020-10-27 ENCOUNTER — TRANSCRIPTION ENCOUNTER (OUTPATIENT)
Age: 50
End: 2020-10-27

## 2020-10-27 VITALS
DIASTOLIC BLOOD PRESSURE: 76 MMHG | HEART RATE: 59 BPM | OXYGEN SATURATION: 100 % | RESPIRATION RATE: 18 BRPM | TEMPERATURE: 98 F | SYSTOLIC BLOOD PRESSURE: 127 MMHG

## 2020-10-27 PROCEDURE — 99233 SBSQ HOSP IP/OBS HIGH 50: CPT

## 2020-10-27 PROCEDURE — 99232 SBSQ HOSP IP/OBS MODERATE 35: CPT

## 2020-10-27 RX ORDER — ACETAMINOPHEN 500 MG
3 TABLET ORAL
Qty: 0 | Refills: 0 | DISCHARGE
Start: 2020-10-27

## 2020-10-27 RX ORDER — IBUPROFEN 200 MG
1 TABLET ORAL
Qty: 0 | Refills: 0 | DISCHARGE
Start: 2020-10-27

## 2020-10-27 RX ADMIN — Medication 975 MILLIGRAM(S): at 11:21

## 2020-10-27 RX ADMIN — Medication 975 MILLIGRAM(S): at 12:00

## 2020-10-27 RX ADMIN — Medication 600 MILLIGRAM(S): at 11:21

## 2020-10-27 RX ADMIN — Medication 600 MILLIGRAM(S): at 00:19

## 2020-10-27 RX ADMIN — AMPICILLIN SODIUM AND SULBACTAM SODIUM 100 GRAM(S): 250; 125 INJECTION, POWDER, FOR SUSPENSION INTRAMUSCULAR; INTRAVENOUS at 11:21

## 2020-10-27 RX ADMIN — Medication 600 MILLIGRAM(S): at 06:45

## 2020-10-27 RX ADMIN — Medication 975 MILLIGRAM(S): at 06:45

## 2020-10-27 RX ADMIN — Medication 600 MILLIGRAM(S): at 12:00

## 2020-10-27 RX ADMIN — Medication 1 APPLICATION(S): at 17:20

## 2020-10-27 RX ADMIN — Medication 975 MILLIGRAM(S): at 17:22

## 2020-10-27 RX ADMIN — Medication 975 MILLIGRAM(S): at 05:45

## 2020-10-27 RX ADMIN — Medication 1 APPLICATION(S): at 05:45

## 2020-10-27 RX ADMIN — Medication 600 MILLIGRAM(S): at 05:45

## 2020-10-27 RX ADMIN — Medication 600 MILLIGRAM(S): at 18:00

## 2020-10-27 RX ADMIN — Medication 975 MILLIGRAM(S): at 18:00

## 2020-10-27 RX ADMIN — AMPICILLIN SODIUM AND SULBACTAM SODIUM 100 GRAM(S): 250; 125 INJECTION, POWDER, FOR SUSPENSION INTRAMUSCULAR; INTRAVENOUS at 05:45

## 2020-10-27 RX ADMIN — Medication 600 MILLIGRAM(S): at 17:22

## 2020-10-27 RX ADMIN — Medication 1 TABLET(S): at 11:21

## 2020-10-27 RX ADMIN — AMPICILLIN SODIUM AND SULBACTAM SODIUM 100 GRAM(S): 250; 125 INJECTION, POWDER, FOR SUSPENSION INTRAMUSCULAR; INTRAVENOUS at 17:20

## 2020-10-27 NOTE — PROGRESS NOTE ADULT - PROBLEM SELECTOR PLAN 1
Initially could've been fungal? which may have a superimposed infection secondary to scratching? s/p Derm biopsy 10/26, will follow up with Derm recs  polymicrobial, negative for HSV- ID following, antibiotics changed to unasyn on 10/25.

## 2020-10-27 NOTE — PROGRESS NOTE ADULT - ASSESSMENT
50F with fibroids s/p myomectomy presenting with left sided pubic lesions, appears ulcerative, with drainage and very painful. Denies history of STD. Last sexual encounter was in 11/2019. HSV IgG 1 and 2 positive. Afebrile. Leukocytosis improving. HIV negative. HSV PCR negative. s/p biopsy pending results.    Superficial swab culture with staph lugdunensis, finegoldia magna, Prevotella bivia, and peptostreptococcus  Clinically improving, pain improving    Recommend:  #Left sided vulvar ulcerative lesions, possibly superimposed infection  -Improving on abx  -Continue unasyn - can increase to 3 grams IV q 6 hours  -If continues to improve, can transition to oral augmentin 875 mg PO BID to complete a 7 day course.  -F/U AFB and fungal swabs as well  -F/U Derm biopsy    #Leukocytosis  -Continue to trend to normal.    Nando Manning MD  Pager [phone]  After 5pm/weekends call [phone]    Discussed plan with primary team.

## 2020-10-27 NOTE — PROGRESS NOTE ADULT - SUBJECTIVE AND OBJECTIVE BOX
CC: Patient is a 50y old  Female who presents with a chief complaint of vulvar excoriations (27 Oct 2020 11:46)    ID following for vulvar ulcers    Interval History/ROS: Patient states pain improving. No fevers, no chills. Overall feeling better.    Rest of ROS negative.    Allergies  No Known Allergies    ANTIMICROBIALS:  ampicillin/sulbactam  IVPB 1.5 every 6 hours    OTHER MEDS:  acetaminophen   Tablet .. 975 milliGRAM(s) Oral every 6 hours  clotrimazole 1% Cream 1 Application(s) Topical two times a day  heparin   Injectable 5000 Unit(s) SubCutaneous every 12 hours  ibuprofen  Tablet. 600 milliGRAM(s) Oral every 6 hours  influenza   Vaccine 0.5 milliLiter(s) IntraMuscular once  lactobacillus acidophilus 1 Tablet(s) Oral daily  lidocaine 1% Injectable 10 milliLiter(s) Local Injection once  lidocaine 2% Gel 1 Application(s) Topical four times a day PRN  morphine  - Injectable 2 milliGRAM(s) IV Push every 4 hours PRN    PE:    Vital Signs Last 24 Hrs  T(C): 36.2 (27 Oct 2020 10:02), Max: 36.8 (26 Oct 2020 21:42)  T(F): 97.1 (27 Oct 2020 10:02), Max: 98.2 (26 Oct 2020 21:42)  HR: 55 (27 Oct 2020 10:02) (55 - 61)  BP: 109/70 (27 Oct 2020 10:02) (109/70 - 122/69)  BP(mean): --  RR: 18 (27 Oct 2020 10:02) (17 - 18)  SpO2: 100% (27 Oct 2020 10:02) (97% - 100%)    Gen: AOx3, NAD  CV: S1+S2 normal, no murmurs  Resp: Clear bilat, no resp distress  Abd: Soft, nontender, +BS  Ext: No LE edema, no wounds  : No Bauman  IV/Skin: No thrombophlebitis, left vulvar ulcers  Neuro: no focal deficits    LABS:      MICROBIOLOGY:  v  .Tissue Punch biopsy mons pubis  10-26-20   Testing in progress  --    No polymorphonuclear cells seen per low power field  No organisms seen per low power field      .Other Other, vaginal ulcer  10-22-20   Culture is being performed.  --  --      .Other vaginal ulcer  10-22-20   Testing in progress  --  --      .Abscess left groin ulcer  10-22-20   Culture yields >4 types of aerobic and/or anaerobic bacteria  Call client services within 7 days if further workup is clinically  indicated.  Culture includes  Rare Staphylococcus lugdunensis  Moderate Finegoldia magna "Susceptibilities not performed"  Few Prevotella bivia "Susceptibilities not performed"  Few Peptostreptococcus anaerobius "Susceptibilities not performed"  --  Staphylococcus lugdunensis    RADIOLOGY:    < from: CT Pelvis w/ IV Cont (10.24.20 @ 11:08) >  IMPRESSION:  Mild inflammation is noted involving the vulva. No abscess or subcutaneous gas.    < end of copied text >

## 2020-10-27 NOTE — PROGRESS NOTE ADULT - REASON FOR ADMISSION
vulvar excoriations

## 2020-10-27 NOTE — PROGRESS NOTE ADULT - SUBJECTIVE AND OBJECTIVE BOX
R4 GYN Progress Note  HD#7    Patient seen and examined at bedside, no acute overnight events. Patient had punch biopsy yesterday with derm assistance. No acute complaints, pain well controlled. She did not require any morphine overnight. Patient is ambulating, tolerating regular diet and voiding without difficulty.     Denies CP, SOB, N/V, fevers, and chills. She continues to take daily pictures of the ulcerations and has not noted much improvement.    Vital Signs Last 24 Hours  T(C): 36.7 (10-27-20 @ 06:11), Max: 36.8 (10-26-20 @ 21:42)  HR: 61 (10-27-20 @ 06:11) (51 - 61)  BP: 112/62 (10-27-20 @ 06:11) (109/68 - 122/69)  RR: 18 (10-27-20 @ 06:11) (16 - 18)  SpO2: 100% (10-27-20 @ 06:11) (97% - 100%)    I&O's Summary    25 Oct 2020 07:01  -  26 Oct 2020 07:00  --------------------------------------------------------  IN: 100 mL / OUT: 1300 mL / NET: -1200 mL    26 Oct 2020 07:01  -  27 Oct 2020 06:24  --------------------------------------------------------  IN: 680 mL / OUT: 900 mL / NET: -220 mL    Physical Exam:  General: NAD, resting comfortably in bed  Lungs: nonlabored breathing  : multiple 0.5cm weeping vulvar ulcerations on b/l lateral portions of mons; covered with Aquacel and liquid barrier film  Ext: No pain or swelling    Labs:             11.4<L>  7.88  )-----------( 317      ( 10-24 @ 05:16 )             37.6         MEDICATIONS  (STANDING):  acetaminophen   Tablet .. 975 milliGRAM(s) Oral every 6 hours  ampicillin/sulbactam  IVPB 1.5 Gram(s) IV Intermittent every 6 hours  clotrimazole 1% Cream 1 Application(s) Topical two times a day  heparin   Injectable 5000 Unit(s) SubCutaneous every 12 hours  ibuprofen  Tablet. 600 milliGRAM(s) Oral every 6 hours  influenza   Vaccine 0.5 milliLiter(s) IntraMuscular once  lactobacillus acidophilus 1 Tablet(s) Oral daily  lidocaine 1% Injectable 10 milliLiter(s) Local Injection once    MEDICATIONS  (PRN):  lidocaine 2% Gel 1 Application(s) Topical four times a day PRN changing dressing  morphine  - Injectable 2 milliGRAM(s) IV Push every 4 hours PRN Severe Pain (7 - 10)

## 2020-10-27 NOTE — ADVANCED PRACTICE NURSE CONSULT - RECOMMEDATIONS
GYN should follow up with patient   Dermatology should follow up with patient and treat vaginal lesions accordingly pending (recommendations s/p biopsy site from derm 24-48 hours) .     Topical Recommendations    Pubis and groin: (this dressing is instead of clotrimazole ointment if Dermatology does not think that ointment is necessary). Soak with luke warm water use peribottle). Pat dry. Apply Aquacel hydrofiber sheet, secure with mesh briefs. Change daily.   B/L groin: Place Interdry textile sheeting, remove to wash & dry affected area, then replace. Individual sheeting may be used for up to 5 days unless soiled.     Please call wound care service line is further assistance is needed ([phone]).

## 2020-10-27 NOTE — DISCHARGE NOTE NURSING/CASE MANAGEMENT/SOCIAL WORK - PATIENT PORTAL LINK FT
You can access the FollowMyHealth Patient Portal offered by [facility] by registering at the following website: [email or web address]. By joining [facility]’s FollowMyHealth portal, you will also be able to view your health information using other applications (apps) compatible with our system.

## 2020-10-27 NOTE — ADVANCED PRACTICE NURSE CONSULT - REASON FOR CONSULT
Follow up visit. Patient known to [facility] nursing last seeing on 10/23. CT of the pelvis reports mild inflammation involving the vulva.   Called by GYN to re-evaluate topical recommendations since patient is complaining that when she removes Aquacel it hurts " because it gets stuck to vaginal lesions" Patient seeing by dermatology yesterday and a biopsy was performed of mons pubis (pending results). Spoke to patient with stated " her lesions are getting better" and that Aquacel " feels as a good dressing expect when removing it". Patient reports since two days ago she started to shower and the Aquacel removes easy. Patient seeing by dermatology on 10/26 s/p biopsy.

## 2020-10-27 NOTE — PROGRESS NOTE ADULT - SUBJECTIVE AND OBJECTIVE BOX
CHIEF COMPLAINT: f/u     SUBJECTIVE / OVERNIGHT EVENTS: Patient seen and examined. No acute events overnight. Still with slight discomfort, but otherwise without any any other complaints.     MEDICATIONS  (STANDING):  acetaminophen   Tablet .. 975 milliGRAM(s) Oral every 6 hours  ampicillin/sulbactam  IVPB 1.5 Gram(s) IV Intermittent every 6 hours  clotrimazole 1% Cream 1 Application(s) Topical two times a day  heparin   Injectable 5000 Unit(s) SubCutaneous every 12 hours  ibuprofen  Tablet. 600 milliGRAM(s) Oral every 6 hours  influenza   Vaccine 0.5 milliLiter(s) IntraMuscular once  lactobacillus acidophilus 1 Tablet(s) Oral daily  lidocaine 1% Injectable 10 milliLiter(s) Local Injection once    MEDICATIONS  (PRN):  lidocaine 2% Gel 1 Application(s) Topical four times a day PRN changing dressing  morphine  - Injectable 2 milliGRAM(s) IV Push every 4 hours PRN Severe Pain (7 - 10)      VITALS:  T(F): 97.1 (10-27-20 @ 10:02), Max: 98.2 (10-26-20 @ 21:42)  HR: 55 (10-27-20 @ 10:02) (55 - 61)  BP: 109/70 (10-27-20 @ 10:02) (109/70 - 122/69)  RR: 18 (10-27-20 @ 10:02) (16 - 18)  SpO2: 100% (10-27-20 @ 10:02)  Wt(kg): --      CAPILLARY BLOOD GLUCOSE      GENERAL: NAD, well-groomed, well-developed  HEAD:  Atraumatic, Normocephalic  EYES: EOMI, PERRLA, conjunctiva and sclera clear  ENMT: Moist mucous membranes  NECK: Supple, No JVD  RESPIRATORY: Clear to auscultation bilaterally; No rales, rhonchi, wheezing, or rubs  CARDIOVASCULAR: Regular rate and rhythm; No murmurs, rubs, or gallops  GASTROINTESTINAL: Soft, Nontender, Nondistended; Bowel sounds present  GENITOURINARY: Not examined  EXTREMITIES:  2+ Peripheral Pulses, No clubbing, cyanosis, or edema  NERVOUS SYSTEM:  Alert & Oriented X3; Moving all 4 extremities; No gross sensory deficits  HEME/LYMPH: No lymphadenopathy noted  SKIN: No rashes or lesions;    LABS:                        MICROBIOLOGY:        RADIOLOGY & ADDITIONAL TESTS:  Imaging Personally Reviewed: [ ] Yes    [ ] Consultant(s) Notes Reviewed:  [x] Care Discussed with Consultants/Other Providers:

## 2020-10-27 NOTE — ADVANCED PRACTICE NURSE CONSULT - ASSESSMENT
Assessed status of dressing and observed small serosanguinous drainage over Aquacel./ Patient reports changing Aquacel this morning. Discussed with patient that Aquacel should be soaked with luke warm water using elis bottle prior to removal. Patient currently using Saf-Clens wound cleansing.     Patient reviewed the importance of following up with dermatology.      patient on the importance of keeping area dry and clean. Patient reports she feel comfortable with Aquacel. Patient able to verbalized importance of using elis bottle to soak dressing prior to removal. Patient can perform dressing change  independently.      GYN notified about new recommendations

## 2020-10-27 NOTE — PROGRESS NOTE ADULT - ASSESSMENT
51yo P0 with no significant PMSH a/w worsening pain from vulvar ulcerations of unknown etiology despite Acyclovir and Keflex outpatient, w/ gram stain w/ stapjosé miguel trevino, on Unasyn, in stable condition.     Neuro: c/w PO tylenol/motrin, Morphine PRN  CV: hemodynamically stable  Pulm: saturating well on room air  FEN: SLIV, regular diet  : voiding freely  - CT Pelvis w/con (10/23): Mild inflammation noted involving the vulva. No abscess or subcutaneous gas.  Heme: OOB and venodynes in bed  ID: Appreciate ID recs, now s/p Acyclovir and Bactrim. Gram stain w/ stapjosé miguel trevino, started on Unasyn 10/25.   - RPR NR  - Lesion: HSV ND; GC/CT   - Urine NG/CT ND  Skin: Appreciate derm and wound care recommendations. S/p punch biopsy yesterday with gram stain with no polymorphnuclear cells. Pathology pending.   Dispo: Continues on Unasyn, now s/p biopsy. Appreciate ID, derm and wound care. Will continue to monitor.     NEREIDA Childs PGY4

## 2020-10-28 LAB — SURGICAL PATHOLOGY STUDY: SIGNIFICANT CHANGE UP

## 2020-10-29 ENCOUNTER — NON-APPOINTMENT (OUTPATIENT)
Age: 50
End: 2020-10-29

## 2020-10-30 LAB
-  AMPICILLIN/SULBACTAM: SIGNIFICANT CHANGE UP
-  CEFAZOLIN: SIGNIFICANT CHANGE UP
-  CLINDAMYCIN: SIGNIFICANT CHANGE UP
-  ERYTHROMYCIN: SIGNIFICANT CHANGE UP
-  GENTAMICIN: SIGNIFICANT CHANGE UP
-  OXACILLIN: SIGNIFICANT CHANGE UP
-  PENICILLIN: SIGNIFICANT CHANGE UP
-  RIFAMPIN: SIGNIFICANT CHANGE UP
-  TETRACYCLINE: SIGNIFICANT CHANGE UP
-  TRIMETHOPRIM/SULFAMETHOXAZOLE: SIGNIFICANT CHANGE UP
-  VANCOMYCIN: SIGNIFICANT CHANGE UP
METHOD TYPE: SIGNIFICANT CHANGE UP

## 2020-10-31 LAB
CULTURE RESULTS: SIGNIFICANT CHANGE UP
ORGANISM # SPEC MICROSCOPIC CNT: SIGNIFICANT CHANGE UP
ORGANISM # SPEC MICROSCOPIC CNT: SIGNIFICANT CHANGE UP
SPECIMEN SOURCE: SIGNIFICANT CHANGE UP

## 2020-11-18 LAB
CULTURE RESULTS: SIGNIFICANT CHANGE UP
SPECIMEN SOURCE: SIGNIFICANT CHANGE UP

## 2020-11-19 LAB
CULTURE RESULTS: SIGNIFICANT CHANGE UP
SPECIMEN SOURCE: SIGNIFICANT CHANGE UP

## 2020-12-12 LAB
CULTURE RESULTS: SIGNIFICANT CHANGE UP
SPECIMEN SOURCE: SIGNIFICANT CHANGE UP

## 2020-12-16 LAB
CULTURE RESULTS: SIGNIFICANT CHANGE UP
SPECIMEN SOURCE: SIGNIFICANT CHANGE UP

## 2022-10-10 NOTE — ED PROVIDER NOTE - ENMT, MLM
Airway patent, Nasal mucosa clear. Mouth with normal mucosa. Throat has no vesicles, no oropharyngeal exudates and uvula is midline. Modesta Peck MD

## 2024-07-15 NOTE — ED ADULT NURSE NOTE - ISOLATION TYPE:
Prescription for antibiotics sent to wrong pharmacy. Prescription to be transferred to [facility] in [place]. RN called pharmacy to confirm.     None